# Patient Record
Sex: FEMALE | Race: WHITE | Employment: FULL TIME | URBAN - NONMETROPOLITAN AREA
[De-identification: names, ages, dates, MRNs, and addresses within clinical notes are randomized per-mention and may not be internally consistent; named-entity substitution may affect disease eponyms.]

---

## 2018-05-07 ENCOUNTER — HOSPITAL ENCOUNTER (OUTPATIENT)
Dept: WOMENS IMAGING | Age: 53
Discharge: HOME | End: 2018-05-07
Payer: COMMERCIAL

## 2018-05-07 DIAGNOSIS — Z12.31 VISIT FOR SCREENING MAMMOGRAM: ICD-10-CM

## 2018-05-07 PROCEDURE — 77063 BREAST TOMOSYNTHESIS BI: CPT

## 2019-05-10 ENCOUNTER — HOSPITAL ENCOUNTER (OUTPATIENT)
Dept: WOMENS IMAGING | Age: 54
Discharge: HOME | End: 2019-05-10
Payer: COMMERCIAL

## 2019-05-10 DIAGNOSIS — Z12.31 VISIT FOR SCREENING MAMMOGRAM: ICD-10-CM

## 2019-05-10 PROCEDURE — 77063 BREAST TOMOSYNTHESIS BI: CPT

## 2019-05-15 ENCOUNTER — HOSPITAL ENCOUNTER (OUTPATIENT)
Dept: WOMENS IMAGING | Age: 54
Discharge: HOME | End: 2019-05-15
Payer: COMMERCIAL

## 2019-05-15 DIAGNOSIS — R92.8 ABNORMAL MAMMOGRAM: ICD-10-CM

## 2019-05-15 DIAGNOSIS — R92.2 BREAST DENSITY: ICD-10-CM

## 2019-05-15 PROCEDURE — G0279 TOMOSYNTHESIS, MAMMO: HCPCS

## 2019-05-15 PROCEDURE — 76642 ULTRASOUND BREAST LIMITED: CPT

## 2019-12-13 ENCOUNTER — HOSPITAL ENCOUNTER (OUTPATIENT)
Dept: WOMENS IMAGING | Age: 54
Discharge: HOME | End: 2019-12-13
Payer: COMMERCIAL

## 2019-12-13 DIAGNOSIS — Z09 FOLLOW-UP EXAM: ICD-10-CM

## 2019-12-13 DIAGNOSIS — R92.2 BREAST DENSITY: ICD-10-CM

## 2019-12-13 PROCEDURE — G0279 TOMOSYNTHESIS, MAMMO: HCPCS

## 2020-06-26 ENCOUNTER — HOSPITAL ENCOUNTER (OUTPATIENT)
Dept: WOMENS IMAGING | Age: 55
Discharge: HOME | End: 2020-06-26
Payer: COMMERCIAL

## 2020-06-26 PROCEDURE — 77063 BREAST TOMOSYNTHESIS BI: CPT

## 2021-02-10 ENCOUNTER — HOSPITAL ENCOUNTER (OUTPATIENT)
Age: 56
Discharge: HOME | End: 2021-02-10
Payer: COMMERCIAL

## 2021-02-10 LAB
ALBUMIN SERPL-MCNC: 4.5 G/DL (ref 3.5–5.1)
ALP BLD-CCNC: 49 U/L (ref 38–126)
ALT SERPL-CCNC: 14 U/L (ref 11–66)
ANION GAP SERPL CALCULATED.3IONS-SCNC: 9 MEQ/L (ref 8–16)
AST SERPL-CCNC: 18 U/L (ref 5–40)
BASOPHILS # BLD: 0.7 %
BASOPHILS ABSOLUTE: 0 THOU/MM3 (ref 0–0.1)
BILIRUB SERPL-MCNC: 1.3 MG/DL (ref 0.3–1.2)
BUN BLDV-MCNC: 16 MG/DL (ref 7–22)
CALCIUM SERPL-MCNC: 9.8 MG/DL (ref 8.5–10.5)
CHLORIDE BLD-SCNC: 104 MEQ/L (ref 98–111)
CHOLESTEROL, TOTAL: 162 MG/DL (ref 100–199)
CO2: 27 MEQ/L (ref 23–33)
CREAT SERPL-MCNC: 0.7 MG/DL (ref 0.4–1.2)
EOSINOPHIL # BLD: 1.4 %
EOSINOPHILS ABSOLUTE: 0.1 THOU/MM3 (ref 0–0.4)
ERYTHROCYTE [DISTWIDTH] IN BLOOD BY AUTOMATED COUNT: 13.2 % (ref 11.5–14.5)
ERYTHROCYTE [DISTWIDTH] IN BLOOD BY AUTOMATED COUNT: 48.3 FL (ref 35–45)
GFR SERPL CREATININE-BSD FRML MDRD: 87 ML/MIN/1.73M2
GLUCOSE BLD-MCNC: 87 MG/DL (ref 70–108)
HCT VFR BLD CALC: 39.1 % (ref 37–47)
HDLC SERPL-MCNC: 94 MG/DL
HEMOGLOBIN: 13.1 GM/DL (ref 12–16)
IMMATURE GRANS (ABS): 0.01 THOU/MM3 (ref 0–0.07)
IMMATURE GRANULOCYTES: 0.2 %
LDL CHOLESTEROL CALCULATED: 59 MG/DL
LYMPHOCYTES # BLD: 35.7 %
LYMPHOCYTES ABSOLUTE: 1.6 THOU/MM3 (ref 1–4.8)
MCH RBC QN AUTO: 33.4 PG (ref 26–33)
MCHC RBC AUTO-ENTMCNC: 33.5 GM/DL (ref 32.2–35.5)
MCV RBC AUTO: 99.7 FL (ref 81–99)
MONOCYTES # BLD: 9.2 %
MONOCYTES ABSOLUTE: 0.4 THOU/MM3 (ref 0.4–1.3)
NUCLEATED RED BLOOD CELLS: 0 /100 WBC
PLATELET # BLD: 261 THOU/MM3 (ref 130–400)
PMV BLD AUTO: 9.7 FL (ref 9.4–12.4)
POTASSIUM SERPL-SCNC: 3.9 MEQ/L (ref 3.5–5.2)
RBC # BLD: 3.92 MILL/MM3 (ref 4.2–5.4)
SEG NEUTROPHILS: 52.8 %
SEGMENTED NEUTROPHILS ABSOLUTE COUNT: 2.3 THOU/MM3 (ref 1.8–7.7)
SODIUM BLD-SCNC: 140 MEQ/L (ref 135–145)
TOTAL PROTEIN: 7 G/DL (ref 6.1–8)
TRIGL SERPL-MCNC: 47 MG/DL (ref 0–199)
TSH SERPL DL<=0.05 MIU/L-ACNC: 2.49 UIU/ML (ref 0.4–4.2)
WBC # BLD: 4.4 THOU/MM3 (ref 4.8–10.8)

## 2021-02-10 PROCEDURE — 36415 COLL VENOUS BLD VENIPUNCTURE: CPT

## 2021-02-10 PROCEDURE — 80053 COMPREHEN METABOLIC PANEL: CPT

## 2021-02-10 PROCEDURE — 85025 COMPLETE CBC W/AUTO DIFF WBC: CPT

## 2021-02-10 PROCEDURE — 80061 LIPID PANEL: CPT

## 2021-02-10 PROCEDURE — 84443 ASSAY THYROID STIM HORMONE: CPT

## 2021-06-30 ENCOUNTER — HOSPITAL ENCOUNTER (OUTPATIENT)
Dept: WOMENS IMAGING | Age: 56
Discharge: HOME | End: 2021-06-30
Payer: COMMERCIAL

## 2021-06-30 DIAGNOSIS — Z12.31 VISIT FOR SCREENING MAMMOGRAM: ICD-10-CM

## 2021-06-30 PROCEDURE — 77063 BREAST TOMOSYNTHESIS BI: CPT

## 2021-07-02 ENCOUNTER — HOSPITAL ENCOUNTER (OUTPATIENT)
Dept: WOMENS IMAGING | Age: 56
Discharge: HOME | End: 2021-07-02
Payer: COMMERCIAL

## 2021-07-02 DIAGNOSIS — R92.0 MICROCALCIFICATION OF RIGHT BREAST ON MAMMOGRAM: ICD-10-CM

## 2021-07-02 PROCEDURE — G0279 TOMOSYNTHESIS, MAMMO: HCPCS

## 2021-07-12 ENCOUNTER — HOSPITAL ENCOUNTER (OUTPATIENT)
Dept: WOMENS IMAGING | Age: 56
Discharge: HOME | End: 2021-07-12
Payer: COMMERCIAL

## 2021-07-12 DIAGNOSIS — R92.0 MICROCALCIFICATION OF RIGHT BREAST ON MAMMOGRAM: ICD-10-CM

## 2021-07-12 DIAGNOSIS — R92.1 BREAST CALCIFICATION, RIGHT: ICD-10-CM

## 2021-07-12 PROCEDURE — 88342 IMHCHEM/IMCYTCHM 1ST ANTB: CPT

## 2021-07-12 PROCEDURE — 77065 DX MAMMO INCL CAD UNI: CPT

## 2021-07-12 PROCEDURE — 88360 TUMOR IMMUNOHISTOCHEM/MANUAL: CPT

## 2021-07-12 PROCEDURE — 2709999900 MAM STEREO BREAST BX W LOC DEVICE 1ST LESION RIGHT

## 2021-07-12 PROCEDURE — 88305 TISSUE EXAM BY PATHOLOGIST: CPT

## 2021-07-12 PROCEDURE — 88341 IMHCHEM/IMCYTCHM EA ADD ANTB: CPT

## 2021-07-12 NOTE — PROGRESS NOTES
Formulation and discussion of sedation / procedure plans, risks, benefits, side effects and alternatives with patient and/or responsible adult completed.     Electronically signed by Shani Garcia MD on 7/12/2021 at 8:14 AM

## 2021-07-12 NOTE — PROGRESS NOTES
Breast Biopsy Flowsheet/Post-Operative Care    Date of Procedure: 7/12/2021  Physician: Dr. Genia Gomez  Technologist: Netta Llamas RT(R)(M)    Biopsy:stereotactic breast biopsy  Lesion type: Non-palpable  Breast: left    Clock face position: Site #1: UOQ        Primary Method of Detection: Mammogram      Microcalcification's: no   Distribution: Grouped    Asymmetry: asymmetric    Biopsy Method:   Mammotome:    Site # 1    Gauge: 14    # of Passes: 6     Clip: Coil        Pre-Op Assessment: (BI-RADS)   4. Suspicious Abnormality    Patient Tolerated Procedure: good  Complications: none  Comments:  Dr. Genia Gomez cancelled the biopsy for the second set of calcs.     Post Operative Care  Steri strips: Yes  Dressing: Gauze, Tape   Ice Applied to Site:  Yes  Evidence of Bleeding:  No    Pain Verbalized: No      Written Discharge Instructions: Yes  Condition at Discharge: good  [facility]    Electronically signed by Bharath Patel on 7/12/2021 at 9:22 AM

## 2021-07-12 NOTE — PROGRESS NOTES
[facility] MEDICAL [facility]  Pre-Biopsy Assessment      Patient Education    Written information about procedure Yes  right   Procedural steps explained Yes Stereotactic Biopsy   Post-op potential: bruising, hematoma, pain Yes    Self-care: activity, care of dressing Yes    Patient verbalized understanding Yes    Consent signed and witnessed Yes      Hormone Therapy Status: none    Recent Medication:  Fish oil  Last Dose: last week                                     Hormone Replacement Therapy: no    Previous Breast Biopsy: no    Previous Diagnosis Cancer: no    Hysterectomy:no    Emotional Status: Calm    Language or Physical Barriers: none    Comments: none      Electronically signed by Bladimir Celis on 7/12/2021 at 8:19 AM

## 2021-07-13 ENCOUNTER — CLINICAL DOCUMENTATION (OUTPATIENT)
Dept: WOMENS IMAGING | Age: 56
End: 2021-07-13

## 2021-07-13 NOTE — PROGRESS NOTES
[facility]    Diagnosis:  DCIS    Home Disposition: Lives with     Contact Information: Arrived alone for biopsy results. Dr. Es Lin discussed pathology and recommended integrated breast clinic. Encouraged Breast Clinic attendance. All cards given for surgeons and oncologists. Wants to see Dr(s): undecided. Patient Expresses Need(s) For: Wanting to have the MRI as soon as possible. Requests Referral to Doctor(s) with appointment(s): n/a    Additional Referral(s): Reynaldo Watson/Monika Mccall: Breast Navigators     Integrated breast cancer clinic appointment: 7-16-21 @ 7:30    Biopsy site status: good    Teaching Sheets provided: Cancer Type: DCIS, What is Breast Cancer, Tumor Markers, Needle Localization, Lumpectomy/Mastectomy Comparison, Radiation Therapy, MRI, Genetic testing,  Breast Cancer Navigation Packet, Nurse Navigation contact information, Breast Clinic appointment and map. Currently on HRT: no    If yes, was patient instructed to stop immediately: N/A     Notes: Explained terms navigators will discuss at next appointments. Questions addressed and encouraged patient to ask Breast Navigators. Will receive call within 24 hrs. Referral emailed to Navigation. Answered yes on Genetic Risk Assessment: yes     Additional information: Sister diagnosed fall 2020. Choose bilateral mastectomy - one being prophylactic- Final pathology- bilateral breast cancer. Sister also had genetic testing which was negative. Testing and treatment done at the [facility]. Guillaume Hollingsworth has not chosen a surgeon, but is anxious to get the breast MRI. I contacted Justine at [address] [place] [address] office to an order the MRI. Radha's results and Dr. Franki Hodgkin recommendation for the MRI [facility]       Results Faxed to:  Dr. Marcy Almanza and [facility]

## 2021-07-15 ENCOUNTER — CLINICAL DOCUMENTATION (OUTPATIENT)
Dept: CASE MANAGEMENT | Age: 56
End: 2021-07-15

## 2021-07-15 NOTE — PROGRESS NOTES
Name: Florina Valles  : 1965  MRN: [id number]     Oncology Navigation- Initial Note: Teaching    Intake-  Contact Type: Cancer Center-teaching with navigator    Diagnosis: Breast-malignant    Home Disposition: Lives with other who is able to assist,  Rody Laureano    Patient needs and barriers to care: Coordination of Care, Knowledge deficit, Emotional Issues/ Fear/ Anxiety and Symptom Management     Referral Source: Outpatient    Receptive to Advanced Care Planning/ Palliative Care:  NA stage 0    Interventions-   General Interventions: Arrived with daughter Ryan RN for teaching on newly diagnosed right breast DCIS ER- [place]-. Genetics/Family Hx: Sister with breast cancer age 40, Father prostate cancer 63's, PGM uterine cancer \"elderly\", pat. Cousin ovarian cancer age 25. Patient requesting genetic testing. Will obtain a referral to [facility]. Education/Screenings:  yes -  Breast cancer information packet and navigation welcome packet reviewed. Printed material provided and reviewed on DCIS, lumpectomy vs mastectomy, tumor markers, lymphedema risks and prevention, post-op exercises to begin when approved by surgeon . Post- op pillow provided. Will need instructed for localization at the time of the procedure. Currently on HRT: no     Referrals: Spiritual Care, financial navigator     Continuum of Care: Diagnosis/Active Treatment    Notes: Teaching completed, questions addressed, emotional support provided. Contact information provided and patient encouraged to call with any questions or concerns. Will follow through continuum of care.     Electronically signed by Ilda Perez RN on 7/15/2021 at 4:26 PM

## 2021-07-16 ENCOUNTER — TELEPHONE (OUTPATIENT)
Dept: SURGERY | Age: 56
End: 2021-07-16

## 2021-07-16 ENCOUNTER — VIRTUAL VISIT (OUTPATIENT)
Dept: ONCOLOGY | Age: 56
End: 2021-07-16
Payer: COMMERCIAL

## 2021-07-16 ENCOUNTER — CLINICAL DOCUMENTATION (OUTPATIENT)
Dept: CASE MANAGEMENT | Age: 56
End: 2021-07-16

## 2021-07-16 ENCOUNTER — HOSPITAL ENCOUNTER (OUTPATIENT)
Dept: MRI IMAGING | Age: 56
Discharge: HOME | End: 2021-07-16
Payer: COMMERCIAL

## 2021-07-16 DIAGNOSIS — C50.411 CARCINOMA OF BREAST UPPER OUTER QUADRANT, RIGHT (HCC): Primary | ICD-10-CM

## 2021-07-16 DIAGNOSIS — Z85.3 PERSONAL HISTORY OF MALIGNANT NEOPLASM OF BREAST: ICD-10-CM

## 2021-07-16 DIAGNOSIS — D05.11 DUCTAL CARCINOMA IN SITU (DCIS) OF RIGHT BREAST: Primary | ICD-10-CM

## 2021-07-16 DIAGNOSIS — Z80.42 FAMILY HISTORY OF PROSTATE CANCER: ICD-10-CM

## 2021-07-16 DIAGNOSIS — Z80.3 FAMILY HISTORY OF BREAST CANCER: ICD-10-CM

## 2021-07-16 DIAGNOSIS — Z80.41 FAMILY HISTORY OF OVARIAN CANCER: ICD-10-CM

## 2021-07-16 PROCEDURE — 6360000004 HC RX CONTRAST MEDICATION: Performed by: OBSTETRICS & GYNECOLOGY

## 2021-07-16 PROCEDURE — 99999 PR OFFICE/OUTPT VISIT,PROCEDURE ONLY: CPT | Performed by: GENETIC COUNSELOR, MS

## 2021-07-16 PROCEDURE — A9579 GAD-BASE MR CONTRAST NOS,1ML: HCPCS | Performed by: OBSTETRICS & GYNECOLOGY

## 2021-07-16 PROCEDURE — 77049 MRI BREAST C-+ W/CAD BI: CPT

## 2021-07-16 RX ADMIN — GADOTERIDOL 10 ML: 279.3 INJECTION, SOLUTION INTRAVENOUS at 13:56

## 2021-07-16 NOTE — PROGRESS NOTES
Name: Olga Post  : 1965  MRN: [id number]    Oncology Navigation Follow-Up Note      Contact Type: Integrated Breast Cancer Clinic    Interventions-   General Interventions: Patient and  arrived to breast clinic. Treatment plan discussed with breast team, Salvatore Sotelo, Lou. Questions addressed. Emotional support given. Education/Screenings: Breast Clinic Recommendation form completed, discussed, and copy given to patient. Refer to Recommendation form under media tab for further information. Referrals: Oncology Rehab for baseline assessment,  consult with Dr. Emily Hunt. Referral made to Dr. Hailey Torres for 2 weeks post op, information given to [facility] and Jcarlos Rubio. Continuum of Care: Diagnosis/Active Treatment     Notes: Blood draw for genetic testing by [facility] kit used. Packaged per protocol and notified [employer] for pick-up today. Shipping label scanned under Media. [address] notified.     Electronically signed by Saintclair Chase, RN on 2021 at 8:19 AM

## 2021-07-16 NOTE — PROGRESS NOTES
[facility] Counseling Program   Hereditary Cancer Risk Assessment     Name: Alison Da Silva   YOB: 1965   Date of Consultation: 21     Ms. Leatha Olsen completed a genetic counseling consultation via telephone on 21. Ms. Leatha Olsen was referred by Dr. Patricia Chinchilla due to her personal and family history of cancer. PERSONAL HISTORY   Ms. Leatha Olsen is a 55 y.o.  female who was recently diagnosed with breast cancer at age 55. 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 She is awaiting the results of her genetic testing before making a final decision regarding her surgical treatment. She is interested in risk reducing mastectomy if results suggest an increased risk for a second primary breast cancer. FAMILY HISTORY  Ms. Leatha Olsen has 1 son (25y) and 1 daughter (28y). She has 5 full sisters. One sister was diagnosed with breast cancer at age 40. She will be checking on the specifics of her sister's genetic test results. Ms. Radu Eden father passed away in his 62s from lung cancer and had a prior history of prostate cancer diagnosed in his 62s. His mother (Ms. Lemon's paternal grandmother) was diagnosed with uterine cancer likely over age 48. Her father had 2 sisters and 1 brother, none with cancer. However, one of her aunt's daughters (Ms. Lemon's paternal first cousin)  of an ovarian cancer at age 25. Ms. Leatha Olsen reports [place] ancestry and denies any known Ashkenazi Sikh heritage. RISK ASSESSMENT   We discussed that approximately 5-10% of cancers are due to a hereditary gene mutation which causes an increased risk for certain cancers. Hereditary cancers are typically diagnosed at younger ages (under age 46y) and occur in multiple generations of a family. Multiple individuals with the same type of cancer (example: breast) or uncommon cancers (example: ovarian, pancreatic, male breast cancer) are also features of hereditary cancers. In summary, Ms. [address] (NCCN) guidelines for genetic testing of the BRCA1/2 genes based on her diagnosis of breast cancer and having a first degree relative with breast cancer under age 48. The NCCN guidelines also recognize that an individual's personal and/or family history may be explained by more than one inherited cancer syndrome. Thus, a multi-gene panel may be more efficient, more cost effecting, and increases the yield of detecting a hereditary mutation which would impact medical management. Given her personal and/or family history, we recommend testing for the following genes at minimum: RYANNE, CHEK2, NBN, and PALB2. DISCUSSION  We discussed that the BRCA1/2 genes are the most common genes associated with hereditary breast and ovarian cancer. We also discussed that genetic testing is available for multiple other genes related to hereditary cancer. Some of these genes are known to carry a significant increased risk for several cancers including colon, breast, uterine, ovarian, stomach, and pancreatic cancer, while some of these genes are believed to have a moderate increased risk for breast and other cancers. We discussed the possibility of finding a mutation in genes with limited information to guide medical management, as well we as the possibility of identifying variants of uncertain significance (VUS). We discussed the risks, benefits, and limitations of genetic testing. Possible test results were discussed as well as potential screening and prevention strategies. Specifically, we discussed increased breast cancer surveillance by mammogram and breast MRI as well as the option of prophylactic mastectomy. We discussed the recommendation for prophylactic oophorectomy for results which suggest an increased risk for ovarian cancer. Lastly, we discussed that the results of Ms. Lemon's genetic testing may be beneficial in defining her risk for cancer as well as for her family members. SUMMARY & PLAN  1) Ms. Carmela Fournier meets the NCCN criteria for genetic testing based on her personal and family history of breast cancer. 2) Genetic testing for the BRCA1/2 genes along with other genes associated with hereditary cancer was offered to Ms. Carmela Fournier. She elected to proceed with the CancerNext Expanded + RNA Insight gene panel. 3) Informed consent was obtained. A blood sample was previously collected and sent to [facility]. 4) We will call Ms. Carmela Fournier with results as soon as they are available. A follow up appointment may be recommended. A summary letter with results and final medical management recommendations will be sent once available. A total of 30 minutes were spent with Ms. Carmela Fournier and 50% of the time was spent educating and counseling. The [facility] National Program would be glad to offer our assistance should you have any questions or concerns about this information. Please feel free to contact us at [phone]. Sharon Cary.  Tricia Dear, MS, [facility]   Licensed Genetic Counselor

## 2021-07-18 NOTE — PROGRESS NOTES
Autumn David MD   General Surgery  New Patient Evaluation in Office  Pt Name: Morgan Farley  Date of Birth 1965   Today's Date: 7/19/2021  Medical Record Number: [id number]  Referring Provider: Kayode Miller*  Primary Care Provider: ANNA Glez - NAHOMI  Chief Complaint:  Chief Complaint   Patient presents with    Surgical Consult     new patient--ref by [facility] for right breast DCIS-MRI breast 7/16     Stage 0 high-grade ER negative, DC negative  ASSESSMENT      1. Ductal carcinoma of right breast ([address]    2. Pre-op testing    3. Dense breast tissue on mammogram         PLANS     1. Bilateral breast MRI prior to any surgical intervention due to very dense breasts on mammography and clinical examination. MRI performed and no suspicious findings. Discussed with patient DCIS is not well seen on MRI. 2.  Genetic testing and counseling. Patient meets criteria. She wishes to proceed. 3.  Treatment of DCIS discussed with patient and her . Discussed reasoning for MRI as with her dense breast tissue could hide an additional lesion. Breast conservation with lumpectomy and postoperative radiation versus mastectomy with and without reconstruction were discussed. Results of genetic testing would likely alter her surgical decision making process. Sister with a history of breast cancer at age 40.  3.  Preoperative routine testing. 5.  Schedule patient for left partial mastectomy with magnetic seed localization pending results of additional testing. Janell Kruse is a 55y.o. 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 Trae Zelaya denied any breast symptoms and presented for screening mammography. She has very, extremely dense breast which lowers the sensitivity of her mammography.   There was a possible cluster of calcifications in the upper outer breast posterior depth. On diagnostic imaging there is a 1 cm cluster of pleomorphic calcifications in the posterior depth. There is a second cluster just inferior and posterior to the first cluster. She underwent stereotactic core biopsy which revealed a high-grade ductal carcinoma in situ which was ER and SD negative. Patient had not had previous breast biopsies. Her sister was diagnosed with triple negative breast cancer at age 40. Her sister underwent genetic testing and no deleterious mutation was identified. She was treated at [address] [place] [address]. Rachel De La Paz has no significant medical comorbidities. Menarche age 16. . She had her children at age 33 and 28. She breast-fed both children. She took oral contraceptive medications for 15 years. Menopause age 47 no history of estrogen replacement therapy. Sister 40 history of breast cancer. Father history of prostate and lung cancer. Paternal grandmother history of uterine cancer paternal cousin history of ovarian cancer at a very young age. Past Medical History  Past Medical History:   Diagnosis Date    Breast cancer ([address] 2021    right    COVID-19 2020       Past Surgical History  Past Surgical History:   Procedure Laterality Date    CHOLECYSTECTOMY      Dr Sayda Zabala  2017    Dr. Zuri Kearns MALIHA STEROTACTIC LOC BREAST BIOPSY RIGHT Right 2021    MALIHA STEROTACTIC LOC BREAST BIOPSY RIGHT 2021 Rayne Lundborg, MD [facility]       Medications  Current Outpatient Medications   Medication Sig Dispense Refill    Omega-3 Fatty Acids (SM FISH OIL PO) Take by mouth daily      MAGNESIUM PO Take by mouth daily       No current facility-administered medications for this visit.      Allergies   No Known Allergies    Family History  Family History   Problem Relation Age of Onset    Heart Disease Mother         with stents    Prostate Cancer Father         in his 63's    Lung Cancer Father     Breast Cancer Sister 40        bilat    No Known Problems Sister     No Known Problems Sister     No Known Problems Sister     No Known Problems Sister     Heart Disease Maternal Grandmother     Alzheimer's Disease Maternal Grandfather     Uterine Cancer Paternal Grandmother         >50    No Known Problems Paternal Grandfather     Ovarian Cancer Paternal Cousin 25       SocialHistory  Social History     Socioeconomic History    Marital status:      Spouse name: Not on file    Number of children: 2    Years of education: Not on file    Highest education level: Not on file   Occupational History    Not on file   Tobacco Use    Smoking status: Never Smoker    Smokeless tobacco: Never Used   Vaping Use    Vaping Use: Never used   Substance and Sexual Activity    Alcohol use: Yes     Comment: occasionally    Drug use: Not on file    Sexual activity: Not on file   Other Topics Concern    Not on file   Social History Narrative    Not on file     Social Determinants of Health     Financial Resource Strain:     Difficulty of Paying Living Expenses:    Food Insecurity:     Worried About Running Out of Food in the Last Year:     Ran Out of Food in the Last Year:    Transportation Needs:     Lack of Transportation (Medical):      Lack of Transportation (Non-Medical):    Physical Activity:     Days of Exercise per Week:     Minutes of Exercise per Session:    Stress:     Feeling of Stress :    Social Connections:     Frequency of Communication with Friends and Family:     Frequency of Social Gatherings with Friends and Family:     Attends Holiness Services:     Active Member of Clubs or Organizations:     Attends Club or Organization Meetings:     Marital Status:    Intimate Partner Violence:     Fear of Current or Ex-Partner:     Emotionally Abused:     Physically Abused:     Sexually Abused:            Review of Systems  Review of Systems   Constitutional: Negative Breasts:         Right: No swelling, bleeding, inverted nipple, mass, nipple discharge, skin change or tenderness. Left: No swelling, bleeding, inverted nipple, mass, nipple discharge, skin change or tenderness. Abdominal:      General: There is no distension. Palpations: There is no mass. Tenderness: There is no abdominal tenderness. Musculoskeletal:         General: No deformity. Cervical back: Neck supple. No rigidity or tenderness. Right lower leg: No edema. Left lower leg: No edema. Lymphadenopathy:      Cervical: No cervical adenopathy. Right cervical: No superficial, deep or posterior cervical adenopathy. Left cervical: No superficial, deep or posterior cervical adenopathy. Upper Body:      Right upper body: No supraclavicular, axillary or pectoral adenopathy. Left upper body: No supraclavicular, axillary or pectoral adenopathy. Skin:     General: Skin is warm and dry. Coloration: Skin is not jaundiced or pale. Findings: No rash. Neurological:      General: No focal deficit present. Mental Status: She is alert and oriented to person, place, and time. Cranial Nerves: No cranial nerve deficit. Psychiatric:         Behavior: Behavior normal.         Thought Content:  Thought content normal.         Lab Results   Component Value Date    WBC 5.1 07/22/2021    HGB 14.8 07/22/2021    HCT 46.2 07/22/2021     07/22/2021    CHOL 162 02/10/2021    TRIG 47 02/10/2021    HDL 94 02/10/2021    ALT 14 02/10/2021    AST 18 02/10/2021     02/10/2021    K 3.9 02/10/2021     02/10/2021    CREATININE 0.7 02/10/2021    BUN 16 02/10/2021    CO2 27 02/10/2021    TSH 2.490 02/10/2021       Performed by: Tarsha Wiseman. Pathology     Atlee Files, [facility]                   [id number]   Assoc.                                              Page 1 of 0 [address], [place] [address]                                                       PROC: 2021   [place]                                    RECV: 2021   [address]                                    RPTD: 2021   [facility], [address]                       MRN:  [id number]    LOC: [facility]                       ACCT: [de-identified]  SEX: F                       : 1965  AGE: 55 Y                          PATHOLOGY REPORT                       ATTN: NNEKA QUIROZ                       REQ: Miyarolo Radford       Copies To:   [place] [facility]; Scar Le       Clinical Information: CALCIFICATIONS     FINAL DIAGNOSIS:   A.  Right breast, upper outer quadrant calcifications \"A\", coil clip,   biopsy:       High-grade ductal carcinoma in situ, solid type with focal necrosis       and associated microcalcifications.    Microcalcifications present in nonneoplastic tissue. BREAST BIOMARKERS*   Estrogen Receptor: (Clone SP1), [employer]       Negative (<1% of cells staining)     Progesterone Receptor: (Clone 1E2), Elementum       Negative (<1% of cells staining)     External Controls:  Adequate   Internal Controls:  Adequate   Standard Assay Conditions:  Met     Staining Method Used:    Formalin fixation    Antigen retrieval type:  Cell Conditioning 1, mild mian    Time in antigen retrieval:  30 minutes    Detection system type:   DAB Ultraview kit     B.  Right breast, upper outer quadrant calcifications \"B\", coil clip,   biopsy:    Benign breast tissue with glandular microcalcifications. Specimen:   A) CORE NEEDLE BREAST BIOPSY, RIGHT CALC, UOQ, COIL CLIP   B) CORE NEEDLE BREAST BIOPSY, RIGHT CALC, UOQ, COIL CLIP       Gross Examination:   A - The [facility],   calcifications, upper outer quadrant, coil clip, A.  Received in   formalin are multiple bits of yellow-white fibrofatty tissue   aggregating to about 2 x 1.8 x 0.2 cm.  1 ns.      Fixative:  10% neutral buffered formalin   Tissue removal time:  08:46   Radiology time:  08:48   Tissue fixation time:  08:50   Total fixation time: 11 hours 10 minutes     B - The container is labeled Justice Mccall, right breast,   calcifications, coil clip, upper outer quadrant, B.  Received in   formalin are two fragments of yellow-white fibrofatty tissue, each   about 1 cm in greatest dimension.  1 ns.  PCF/DKR:[id number]     Fixative:  10% neutral buffered formalin   Tissue removal time:  08:46   Radiology time:  08:48   Tissue fixation time:  08:50   Total fixation time: 11 hours 10 minutes     Microscopic Examination:   A.  Sections show few ducts expanded by tumor cells containing enlarged   vesicular nuclei with eosinophilic nucleoli.  Occasional ducts are   distorted and irregular with periductal stromal fibrosis and a   lymphocytic infiltrate.  That said, histologic evidence of   microinvasive carcinoma is absent.  Immunohistochemistry for p63 and   CK5/6 highlight retained myoepithelial cells.  Controls are adequate. Procedure: Needle biopsy   Specimen laterality: Right   Tumor site: Upper outer quadrant   Histologic type: Ductal carcinoma in situ   Architectural patterns: Solid   Nuclear grade: Grade III (high)   Necrosis: Present, focal   Microcalcifications: Present in DCIS and in nonneoplastic tissue     B.  Sections show dense fibroglandular breast tissue with stromal   fibrosis and occasional glandular microcalcifications. Suann Pillar is no   evidence of atypia or malignancy. *This test was developed and its performance characteristics determined   by [address] [place] [address] has not been cleared or   approved by the U.S. Food and Drug Administration.  Pursuant to the   requirements of CLIA, this laboratory has established and verified the   test's accuracy and precision.  Additional information about this type   of test is available upon request.     [id number]   [id number]   [id number]   [id number]                                                       <Sign Toñito Patel M.D., F.C. A. P       NV[facility]/ [address] [facility]  Printed on:  7/13/2021   Isaiah Gao, [address]           Narrative   EXAM: MRI BREAST BILATERAL W WO CONTRAST        INDICATION: Newly diagnosed right breast ductal carcinoma in situ upper outer quadrant, posterior depth.       HISTORY: 56 years, Female, Personal history of malignant neoplasm of breast       COMPARISON: 7/12/2020, 7/2/2021, 6/30/2021, 6/26/2020, 5/10/2019.       TECHNIQUE: Axial and coronal T2 STIR, sagittal T1, axial T1 fat saturated pre and postcontrast dynamic imaging was performed of both breasts. Intravenous ProHance gadolinium was given. Images were reviewed on a separate dedicated 3-D workstation and time    intensity curves were analyzed.        FINDINGS:       Amount of Fibroglandular Tissue: The breast parenchyma is extremely dense. Background Parenchymal Enhancement:  Minimal.       Right breast: The biopsy clip is noted in the upper outer right breast, posterior depth. On either side of this, there is minimal enhancement. There is no suspicious morphology. There is no suspicious enhancement. There is no masslike area. There are no    areas of suspicious enhancement anywhere in the breast. There is no suspicious morphology. No lesions are present.       Left breast: There are no areas of suspicious enhancement. There is no suspicious morphology. No lesions are present.       There are no abnormalities in the axilla, mediastinum or visualized bones. There do appear to be a few small simple cysts in the liver.                   Impression   No MR evidence of malignancy on either side.  The biopsy-proven site of DCIS in the right upper outer quadrant does not have an MR correlate.           Surgical management is recommended.               BI-RADS CATEGORY 6: Known biopsy proven malignancy.       Management: Surgical excision when clinically errors which are inherent in voice recognition technology. **       Final report electronically signed by Dr. Blank June on 6/30/2021 4:53 PM            LOCATION: [facility]       PROCEDURE: MALIHA HAI DIGITAL DIAGNOSTIC UNILATERAL RIGHT       CLINICAL INFORMATION: Microcalcification of right breast on mammogram . Tomosynthesis. Right breast calcifications seen on screening mammography. .       Tyrer-Cuzick calculation reports this patient's 10 year risk for developing breast cancer is 6.3% and lifetime risk is 19.7%.        COMPARISON: 6/30/2021, 6/26/2020, 12/13/2009, 5/10/2019 and 5/7/2018.       TECHNIQUE: Images of the right breast include a full field LM view and magnification CC, MLO and LM views. A magnification exaggerated cc was also obtained. . Tomosynthesis and CAD were utilized.        BREAST COMPOSITION: The tissue of the breast(s) is extremely dense, which lowers the sensitivity of mammography.       FINDINGS:        In the upper outer right breast there is a 1 cm cluster of pleomorphic calcifications. This is posterior depth. These are indeterminate.       There is a possible 2nd cluster of calcifications seen on the MLO and LM magnification view just inferior and posterior to this 1st cluster. These are indeterminate. These are not readily seen on the cc view.       No other significant masses, calcifications, or other findings are seen in the breast(s).             Impression       1. Cluster of pleomorphic calcifications in the upper outer right breast posterior depth. A stereotactic guided biopsy is recommended.       2. 2nd cluster of possible calcifications are seen on the LM and MLO views only just inferior to the 1st cluster. Stereotactic guided biopsy of these calcifications is also recommended.       These results were discussed with the patient. The tech navigator was notified.  We will arrange scheduling the patient for her procedure per department protocol.                   BI-RADS CATEGORY 4B - Intermediate suspicion for malignancy.       Management: Tissue diagnosis.  Biopsy should be performed in the absence of clinical contraindication.               **This report has been created using voice recognition software. It may contain minor errors which are inherent in voice recognition technology. **       Final report electronically signed by Dr. Van Kee on 2021 11:52 AM          [facility] HAI DIGITAL SCREEN SELF REFERRAL W OR WO CAD BILATERAL (Order [id number])  Narrative   LOCATION: [facility]       PROCEDURE: MALIHA HAI DIGITAL SCREEN SELF REFERRAL W OR WO CAD BILATERAL       CLINICAL INFORMATION: Visit for screening mammogram. Tomosynthesis.       CLINICAL:     Self-referred screening mammogram   PATIENT MEDICAL HISTORY:  No relevant medical history has been documented for this patient. FAMILY HISTORY:   Family medical history includes breast cancer in sister. RISK VALUES: Tyrer-Cuzick 10yr.: 6.3%, Tyrer-Cuzick life:   19.7%       COMPARISON: 2020, 2019, 5/10/2019 and 2018.       TECHNIQUE: Bilateral CC and MLO views of the breasts were obtained.  3D tomosynthesis was utilized.  CAD was utilized.       BREAST COMPOSITION: The tissue of the breast(s) is extremely dense, which lowers the sensitivity of mammography.       FINDINGS: Gladis Collar is a possible cluster of calcifications in the upper outer right breast, posterior depth.        No other significant masses, calcifications, or other findings are seen in the breast(s).                Impression   Possible cluster of calcifications in the upper outer right breast. Magnification views and a right LM view are recommended.       The patient will be contacted by our department per protocol regarding additional imaging and scheduling.       .           BI-RADS CATEGORY 0: NEED ADDITIONAL EVALUATION AND/OR PRIOR MAMMOGRAMS FOR COMPARISON.       Management Recommendation: Recall for additional imaging.       The patient will be contacted by our department for additional imaging/scheduling.               **This report has been created using voice recognition software. It may contain minor errors which are inherent in voice recognition technology. **       Final report electronically signed by Dr. Gayle Del Valle on 6/30/2021 4:53 PM             Imaging reviewed.

## 2021-07-19 ENCOUNTER — TELEPHONE (OUTPATIENT)
Dept: SPIRITUAL SERVICES | Facility: CLINIC | Age: 56
End: 2021-07-19

## 2021-07-19 ENCOUNTER — OFFICE VISIT (OUTPATIENT)
Dept: SURGERY | Age: 56
End: 2021-07-19
Payer: COMMERCIAL

## 2021-07-19 VITALS
HEART RATE: 74 BPM | RESPIRATION RATE: 18 BRPM | SYSTOLIC BLOOD PRESSURE: 118 MMHG | HEIGHT: 61 IN | BODY MASS INDEX: 22.41 KG/M2 | DIASTOLIC BLOOD PRESSURE: 58 MMHG | OXYGEN SATURATION: 93 % | TEMPERATURE: 97.3 F | WEIGHT: 118.7 LBS

## 2021-07-19 DIAGNOSIS — R92.2 DENSE BREAST TISSUE ON MAMMOGRAM: ICD-10-CM

## 2021-07-19 DIAGNOSIS — Z01.818 PRE-OP TESTING: ICD-10-CM

## 2021-07-19 DIAGNOSIS — C50.911 DUCTAL CARCINOMA OF RIGHT BREAST (HCC): Primary | ICD-10-CM

## 2021-07-19 PROCEDURE — 99244 OFF/OP CNSLTJ NEW/EST MOD 40: CPT | Performed by: SURGERY

## 2021-07-19 NOTE — LETTER
[address] [facility]  [place] [id number] [facility] [address]  Phone: [phone]  Fax: [phone]    Emma Vargas MD        July 25, 2021    Patient: Efrain Hopkins   MR Number: [id number]   YOB: 1965   Date of Visit: 7/19/2021     Dear Dr. Levar Villareal,    Thank you for the request for consultation for Efrain Hopkins to me for the evaluation of high-grade ductal carcinoma in situ of the right breast. Below are the relevant portions of my assessment and plan of care. 1. Ductal carcinoma of right breast ([address]    2. Pre-op testing    3. Dense breast tissue on mammogram        1. Bilateral breast MRI prior to any surgical intervention due to very dense breasts on mammography and clinical examination. MRI performed and no suspicious findings. Discussed with patient DCIS is not well seen on MRI. 2.  Genetic testing and counseling. Patient meets criteria. She wishes to proceed. 3.  Treatment of DCIS discussed with patient and her . Discussed reasoning for MRI as with her dense breast tissue could hide an additional lesion. Breast conservation with lumpectomy and postoperative radiation versus mastectomy with and without reconstruction were discussed. Results of genetic testing would likely alter her surgical decision making process. Sister with a history of breast cancer at age 40.  3.  Preoperative routine testing. 5.  Schedule patient for left partial mastectomy with magnetic seed localization pending results of additional testing. If you have questions, please do not hesitate to call me. I look forward to following [employer]  along with you.     Sincerely,          Emma Vargas MD

## 2021-07-19 NOTE — TELEPHONE ENCOUNTER
[facility] PROGRESS NOTE      Patient: Odean Oppenheim            YOB: 1965  Age: 55 y.o. Gender: female            Assessment:  Tara\" as she likes to be called is a 55year old female who is being referred for spiritual care services by the staff at the [facility] wellness center. She was called on her phone by this . she  Answered the phone and stated she is doing good. Interventions:  After our services were explained, words of encouragement given to Morris. She is Yarsanism and belongs to [address] in [facility]. Outcomes:  She is not interested in our services at this time stating,\"I'm well supported by my Hinduism. \"    Plan: 1. Care Plan:  Continue spiritual and emotional care for patient and family. Including prayers. Tara tool down our phone number incase she would need us in the future.      Electronically signed by Mendel Madden, on 7/19/2021 at 2:49 PM.  [address] [facility]  [phone]

## 2021-07-22 ENCOUNTER — HOSPITAL ENCOUNTER (OUTPATIENT)
Age: 56
Discharge: HOME | End: 2021-07-22
Payer: COMMERCIAL

## 2021-07-22 DIAGNOSIS — C50.911 DUCTAL CARCINOMA OF RIGHT BREAST (HCC): ICD-10-CM

## 2021-07-22 DIAGNOSIS — Z01.818 PRE-OP TESTING: ICD-10-CM

## 2021-07-22 LAB
BASOPHILS # BLD: 1 %
BASOPHILS ABSOLUTE: 0.1 THOU/MM3 (ref 0–0.1)
EOSINOPHIL # BLD: 3.5 %
EOSINOPHILS ABSOLUTE: 0.2 THOU/MM3 (ref 0–0.4)
ERYTHROCYTE [DISTWIDTH] IN BLOOD BY AUTOMATED COUNT: 12.4 % (ref 11.5–14.5)
ERYTHROCYTE [DISTWIDTH] IN BLOOD BY AUTOMATED COUNT: 45.7 FL (ref 35–45)
HCT VFR BLD CALC: 46.2 % (ref 37–47)
HEMOGLOBIN: 14.8 GM/DL (ref 12–16)
IMMATURE GRANS (ABS): 0.01 THOU/MM3 (ref 0–0.07)
IMMATURE GRANULOCYTES: 0.2 %
LYMPHOCYTES # BLD: 32 %
LYMPHOCYTES ABSOLUTE: 1.6 THOU/MM3 (ref 1–4.8)
MCH RBC QN AUTO: 32 PG (ref 26–33)
MCHC RBC AUTO-ENTMCNC: 32 GM/DL (ref 32.2–35.5)
MCV RBC AUTO: 100 FL (ref 81–99)
MONOCYTES # BLD: 8.8 %
MONOCYTES ABSOLUTE: 0.4 THOU/MM3 (ref 0.4–1.3)
NUCLEATED RED BLOOD CELLS: 0 /100 WBC
PLATELET # BLD: 287 THOU/MM3 (ref 130–400)
PMV BLD AUTO: 9.3 FL (ref 9.4–12.4)
RBC # BLD: 4.62 MILL/MM3 (ref 4.2–5.4)
SEG NEUTROPHILS: 54.5 %
SEGMENTED NEUTROPHILS ABSOLUTE COUNT: 2.8 THOU/MM3 (ref 1.8–7.7)
WBC # BLD: 5.1 THOU/MM3 (ref 4.8–10.8)

## 2021-07-22 PROCEDURE — 36415 COLL VENOUS BLD VENIPUNCTURE: CPT

## 2021-07-22 PROCEDURE — 85025 COMPLETE CBC W/AUTO DIFF WBC: CPT

## 2021-07-25 ASSESSMENT — ENCOUNTER SYMPTOMS
ABDOMINAL PAIN: 0
NAUSEA: 0
SHORTNESS OF BREATH: 0
BLOOD IN STOOL: 0
COLOR CHANGE: 0
COUGH: 0
VOMITING: 0
SORE THROAT: 0
WHEEZING: 0

## 2021-08-02 ENCOUNTER — HOSPITAL ENCOUNTER (OUTPATIENT)
Dept: PHYSICAL THERAPY | Age: 56
Setting detail: THERAPIES SERIES
Discharge: HOME | End: 2021-08-02
Payer: COMMERCIAL

## 2021-08-02 PROCEDURE — 97161 PT EVAL LOW COMPLEX 20 MIN: CPT

## 2021-08-02 PROCEDURE — 97110 THERAPEUTIC EXERCISES: CPT

## 2021-08-02 NOTE — PROGRESS NOTES
* PLEASE SIGN, DATE AND TIME CERTIFICATION BELOW AND RETURN TO [facility] OUTPATIENT REHABILITATION (FAX #: [phone]). ATTEST/CO-SIGN IF ACCESSING VIA INAFFiRiS. THANK YOU.**    I certify that I have examined the patient below and determined that Physical Medicine and Rehabilitation service is necessary and that I approve the established plan of care for up to 90 days or as specifically noted. Attestation, signature or co-signature of physician indicates approval of certification requirements.    ________________________ ____________ __________  Physician Signature   Date   Time  [facility]  PHYSICAL THERAPY  [x] EVALUATION    [x]  [facility]     Date: 2021  Patient Name:  Matt Kapadia  : 1965  MRN: [id number]      Referring Practitioner Jessica Brown MD with Dr. Ashley Campos to follow   Diagnosis Malignant neoplasm of upper-outer quadrant of right female breast [C50.411]    Treatment Diagnosis Postural Abnormality, Z71.9   Date of Evaluation 21    Additional Pertinent History COVID-19      Functional Outcome Measure Used O: QuickDASH   Functional Outcome Score 0% (21)       Insurance: Primary: Payor: Robinson Goldberg /  /  / ,   Secondary:    Authorization Information: Aquatics and modalities approved, no ionto   Visit # 1, 1/10 for progress note   Visits Allowed: 79/[id number] year   Recertification Date:    Physician Follow-Up: 21 R lumpectomy with MAG seed placement, waiting for genetic testing   Physician Orders: Prehab   History of Present Illness: 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 Social/Functional History and Current Status:  Medications and Allergies have been reviewed and are listed on Medical History Questionnaire. Matt Kapadia lives with spouse in a multiple floor home with stairs and no handrail to enter.     Task Previous Current   ADLs  Independent Independent   IADL's Independent Independent   Ambulation Independent Independent   Transfers Independent Independent   Recreation Independent Enjoys walking, boating on [address]   Driving Active  Active    Work [employer]. 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 OBJECTIVE:   Posture: Poor, Protruded Head  Palpation: no tenderness reported  Observation: No obvious deformity    Range of Motion/Strength (Range of Motion in degrees)    Right Left Comments   Shoulder Flexion PROM 180 180    Shoulder ABDuction PROM 180 180    Shoulder Strength 4+/5 4+/5    Elbow Strength 5/5 5/5      Circumferential Measurements:   Upper Extremity Circumferential Measurements    Right (cm) Left (cm) Comments   Met Heads 17.5 17.5    Ulnar Styloid Process 13.8 13.7    10 cm distal to Lateral Epicondyle 20.6 20.7    Elbow Crease 22.5 22.8    10 cm proximal from Lateral Epicondyle 26.2 26.8    Axilla 28 26.9    Total 128.6 128.4 Axilla - to - Axilla: NT     Brief Fatigue Inventory: 0 (0: none, 1-3: mild, 4-6: moderate, 7-10: severe)  Quick Dash: 0% impaired    Treatment Initiated: Educated to exercises to begin 1 week after surgery pending surgeon's release: scapular retractions, wall walks, and pendulums for left/right, clockwise and counterclockwise. Provided with handout and instructed to focus on 1st 2 exercises if doing well until therapy follow up visit but if having more soreness and/or stiffness, include pendulums to help allow movement without pain or aggravation and prevent glenohumeral capsular restriction. Briefly discussed why circumferential measurements were taken this date as she will be having a sentinel lymph node biopsy which will place her at low risk for lymphedema.  Educated pt to the lymphatic system and informed that lymphedema risk will be discussed more fully at her follow up visit    TREATMENT   Precautions:    Pain: 0/10    X in shaded column indicates activity completed today   Modalities Parameters/  Location  Notes         Manual Therapy Time/Technique  Notes         Exercise/Intervention   Notes            Specific Interventions Next Treatment: PORi protocol for gentle manual lymphatic drainage and myofascial release, gentle stretches, strengthening, conditioning, balance, posture, lymphedema education as needed    Activity Tolerance: Patient tolerated treatment well    ASSESSMENT:  Assessment: Pt presents with poor posture. She will be having a R lumpectomy or mastectomy, pending genetics, which will place her at risk for decrease in her posture, shoulder mobility, strength and function as well as potentially lymphedema. She would benefit from skilled PT/Oncology Rehab to allow optimize surgical recovery and reduce risk of secondary complications from further oncologic treatment to allow full return to her previous level of activities for quality of life with survivorship. Body Structures/Functions/Activity Limitations:Impaired posture  Prognosis: good    GOALS:  Patient Goal: Get back to normal after surgery    Short Term Goals to be met in 12 weeks:  1. See LTGs    Long Term Goals to be met in 12 weeks:   1. Pt to demo right shoulder PROM flexion returned to 180 deg after surgery for ADLs. 2. Pt to demo right shoulder PROM abduction returned to 180 deg after surgery for ADLs. 3. Pt to demo right shoulder strength returned to 4+/5 after surgery for IADLs. 4. Pt to demo QuickDASH score returned to 0% after surgery for return to previous level of functioning for survivorship. 5. Pt to demo right UE lymphedema measures controlled at <131.6 cm with independence with lymphedema risk reduction strategies for safety after discharge. Patient Education: Plan of care, goals. See \"Treatment Initiated\" for further details.   Education Outcome: Verbalized understanding  Education Barriers: None    PLAN:  Treatment Recommendations: Strengthening, Range of Motion, Conditioning, Lymphedema Management, Manual Techniques, Pain Management, Neuropathy Management, Postural Re-Training, Body Mechanics/Ergonomics, Home Exercise Prescription, and Safety Education    Plan of care initiated. Plan to see patient up to 2 times per week for up to 12 weeks to address the treatment planned outlined above, with next appointment to be 2-3 weeks following surgery per protocol.     Time In 1435   Time Out 1530   Timed Code Minutes: 15 min   Total Treatment Time: 55 min       Electronically Signed by: Elaina Root, PT PT, DPT, [facility] [id number] 8/2/2021

## 2021-08-04 ENCOUNTER — TELEPHONE (OUTPATIENT)
Dept: ONCOLOGY | Age: 56
End: 2021-08-04

## 2021-08-04 NOTE — TELEPHONE ENCOUNTER
3 Formerly named [facility]   Hereditary Cancer Risk Assessment     Name: Shawnee Rosales  YOB: 1965  Date of Results Disclosure: 08/04/21      HISTORY   Ms. Carmela Fournier was seen for genetic counseling at the request of Simran Toure MD due to her personal and family history of breast cancer. At that time, Ms. Carmela Fournier chose to pursue genetic testing via the CancerNext Expanded + RNA gene panel. These results were discussed with Ms. Carmela Fournier via telephone. A summary of Ms. Lemon's results and recommendations are below. RESULTS  Radario Scopis CancerNext-Expanded Panel + RNAinsight: NEGATIVE - NO CLINICALLY SIGNIFICANT MUTATIONS DETECTED   This panel included the analysis of 77 genes associated with hereditary cancer including: AIP, ALK, APC, RYANNE, BAP1, BARD1, BLM, BMPR1A, BRCA1, BRCA2, BRIP1, CDC73, CDH1, CDK4, CDKN1B, CDKN2A, CHEK2, CTNNA1, DICER1, EGFR, EGLN1, EPCAM, FANCC, FH, FLCN, GALNT12, GREM1, HOXB13, KIF1B, KIT1, LZTR1, MAX, MEN1, MET, MITF, MLH1, MSH2, MSH3, MSH6, MUTYH, NBN, NF1, NF2, NTHL1, PALB2, PDGFRA, PHOX2B, PMS2, POLD1, POLE, POT1, EYJLD3O, PTCH1, PTEN, RAD51C, RAD51D, RB1, RECQL, RET, SDHA, SDHAF2, SDHB, SDHC, ,SDHD, SMAD4, SMARCA4, SMARCB1, SMARCE1, STK11, SUFU, ILGP014, TP53, TSC1, TSC2, VHL, and XRCC2. In addition, no clinically relevant aberrant RNA transcripts were detected in select genes. Please refer to genetic test report for technical details. We discussed that Ms. Lemon's negative test result greatly reduces the likelihood that her personal history of cancer is due to a hereditary mutation. It is possible that her personal history of cancer may be due to a gene for which testing was not performed or which has yet to be discovered. RECOMMENDATIONS  1) The outcome of Ms. Edgars genetic test results do not affect her current cancer treatment. Ms. Carmela Fournier should continue cancer treatment and surveillance as directed by her physicians.     2) Ms. Camrela Fournier should continue general population cancer screening guidelines as directed by her physicians. RECOMMENDATIONS FOR FAMILY MEMBERS   1) Genetic testing is not recommended for Ms. Lemon's children based on her negative test results. However, this recommendation does not take into consideration any family history of cancer in their paternal family. 2) It is possible that the cancers in Ms. Lemon's family are due to a hereditary gene mutation that she did not inherit. Therefore, her relatives (particularly those with a current or previous cancer diagnosis) may consider genetic counseling and testing to clarify this possibility. Relatives may contact the [address] [place] [address] at [phone] or locate a genetic counselor at www. [email or web address]. [email or web address].     3) We encourage Ms. Lemon's relatives to discuss their family history of cancer with their physicians to determine the most appropriate cancer screening recommendations. Ms. Alecia Amezcua female relatives may benefit from a formal breast cancer risk assessment by the Tennie Tay or Alejandro Cater risk models to determine if additional breast cancer screening is warranted. SUMMARY & PLAN   1) Ms. Edgars genetic test results are negative meaning there were no clinically significant mutations detected in the 77 genes analyzed. 2) There are no changes in medical management for Ms. Jessica Ellington based on her negative genetic test results. 3) We encourage Ms. Jessica Ellington to contact us every 1-2 years to determine if there are any new genetic testing or research options available. 4) We encourage Ms. Jessica Ellington to contact us with updates to her personal and/or familys cancer history as this information may alter our assessment and/or recommendations. The [facility] would be glad to offer our assistance should you have any questions or concerns about this information. Please feel free to contact us at [phone]. Kay Stockton.  Jim Singh MS, [facility]   Licensed Genetic Counselor         CC:  Ms. Virginia Carcamo MD

## 2021-08-09 ENCOUNTER — HOSPITAL ENCOUNTER (OUTPATIENT)
Dept: WOMENS IMAGING | Age: 56
Discharge: HOME | End: 2021-08-09
Payer: COMMERCIAL

## 2021-08-09 DIAGNOSIS — C50.911 DUCTAL CARCINOMA OF RIGHT BREAST (HCC): ICD-10-CM

## 2021-08-09 DIAGNOSIS — D05.11 DUCTAL CARCINOMA IN SITU (DCIS) OF RIGHT BREAST: ICD-10-CM

## 2021-08-09 PROCEDURE — 2709999900 US PLACE BREAST LOC DEVICE 1ST LESION RIGHT

## 2021-08-09 PROCEDURE — 2709999900 MAM NEEDLE BREAST LOCALIZATION RIGHT

## 2021-08-09 PROCEDURE — 77065 DX MAMMO INCL CAD UNI: CPT

## 2021-08-11 ENCOUNTER — ANESTHESIA (OUTPATIENT)
Dept: OPERATING ROOM | Age: 56
End: 2021-08-11
Payer: COMMERCIAL

## 2021-08-11 ENCOUNTER — ANESTHESIA EVENT (OUTPATIENT)
Dept: OPERATING ROOM | Age: 56
End: 2021-08-11
Payer: COMMERCIAL

## 2021-08-11 ENCOUNTER — HOSPITAL ENCOUNTER (OUTPATIENT)
Dept: WOMENS IMAGING | Age: 56
Discharge: HOME | End: 2021-08-11
Payer: COMMERCIAL

## 2021-08-11 ENCOUNTER — HOSPITAL ENCOUNTER (OUTPATIENT)
Age: 56
Setting detail: OUTPATIENT SURGERY
Discharge: HOME | End: 2021-08-11
Attending: SURGERY | Admitting: SURGERY
Payer: COMMERCIAL

## 2021-08-11 VITALS
OXYGEN SATURATION: 100 % | DIASTOLIC BLOOD PRESSURE: 56 MMHG | SYSTOLIC BLOOD PRESSURE: 91 MMHG | RESPIRATION RATE: 17 BRPM

## 2021-08-11 VITALS
OXYGEN SATURATION: 100 % | TEMPERATURE: 97.7 F | BODY MASS INDEX: 22.47 KG/M2 | HEART RATE: 59 BPM | DIASTOLIC BLOOD PRESSURE: 66 MMHG | HEIGHT: 61 IN | SYSTOLIC BLOOD PRESSURE: 102 MMHG | WEIGHT: 119 LBS | RESPIRATION RATE: 16 BRPM

## 2021-08-11 DIAGNOSIS — D05.11 DUCTAL CARCINOMA IN SITU (DCIS) OF RIGHT BREAST: ICD-10-CM

## 2021-08-11 DIAGNOSIS — D05.11 DUCTAL CARCINOMA IN SITU (DCIS) OF RIGHT BREAST: Primary | ICD-10-CM

## 2021-08-11 PROCEDURE — 76098 X-RAY EXAM SURGICAL SPECIMEN: CPT

## 2021-08-11 PROCEDURE — 7100000011 HC PHASE II RECOVERY - ADDTL 15 MIN: Performed by: SURGERY

## 2021-08-11 PROCEDURE — 88307 TISSUE EXAM BY PATHOLOGIST: CPT

## 2021-08-11 PROCEDURE — 7100000010 HC PHASE II RECOVERY - FIRST 15 MIN: Performed by: SURGERY

## 2021-08-11 PROCEDURE — 6360000002 HC RX W HCPCS: Performed by: SURGERY

## 2021-08-11 PROCEDURE — 3700000001 HC ADD 15 MINUTES (ANESTHESIA): Performed by: SURGERY

## 2021-08-11 PROCEDURE — 3700000000 HC ANESTHESIA ATTENDED CARE: Performed by: SURGERY

## 2021-08-11 PROCEDURE — 2709999900 HC NON-CHARGEABLE SUPPLY: Performed by: SURGERY

## 2021-08-11 PROCEDURE — 3600000012 HC SURGERY LEVEL 2 ADDTL 15MIN: Performed by: SURGERY

## 2021-08-11 PROCEDURE — 2580000003 HC RX 258: Performed by: SURGERY

## 2021-08-11 PROCEDURE — 88342 IMHCHEM/IMCYTCHM 1ST ANTB: CPT

## 2021-08-11 PROCEDURE — 3600000002 HC SURGERY LEVEL 2 BASE: Performed by: SURGERY

## 2021-08-11 PROCEDURE — 19301 PARTIAL MASTECTOMY: CPT | Performed by: SURGERY

## 2021-08-11 PROCEDURE — 2500000003 HC RX 250 WO HCPCS: Performed by: NURSE ANESTHETIST, CERTIFIED REGISTERED

## 2021-08-11 PROCEDURE — 6360000002 HC RX W HCPCS: Performed by: NURSE ANESTHETIST, CERTIFIED REGISTERED

## 2021-08-11 PROCEDURE — 2500000003 HC RX 250 WO HCPCS: Performed by: SURGERY

## 2021-08-11 RX ORDER — DEXAMETHASONE SODIUM PHOSPHATE 4 MG/ML
INJECTION, SOLUTION INTRA-ARTICULAR; INTRALESIONAL; INTRAMUSCULAR; INTRAVENOUS; SOFT TISSUE PRN
Status: DISCONTINUED | OUTPATIENT
Start: 2021-08-11 | End: 2021-08-11 | Stop reason: SDUPTHER

## 2021-08-11 RX ORDER — SODIUM CHLORIDE 9 MG/ML
INJECTION, SOLUTION INTRAVENOUS CONTINUOUS
Status: DISCONTINUED | OUTPATIENT
Start: 2021-08-11 | End: 2021-08-11 | Stop reason: HOSPADM

## 2021-08-11 RX ORDER — ACETAMINOPHEN 325 MG/1
650 TABLET ORAL EVERY 4 HOURS PRN
Status: DISCONTINUED | OUTPATIENT
Start: 2021-08-11 | End: 2021-08-11 | Stop reason: HOSPADM

## 2021-08-11 RX ORDER — SODIUM CHLORIDE 9 MG/ML
25 INJECTION, SOLUTION INTRAVENOUS PRN
Status: DISCONTINUED | OUTPATIENT
Start: 2021-08-11 | End: 2021-08-11 | Stop reason: HOSPADM

## 2021-08-11 RX ORDER — LIDOCAINE HYDROCHLORIDE 20 MG/ML
INJECTION, SOLUTION INFILTRATION; PERINEURAL PRN
Status: DISCONTINUED | OUTPATIENT
Start: 2021-08-11 | End: 2021-08-11 | Stop reason: SDUPTHER

## 2021-08-11 RX ORDER — SODIUM CHLORIDE 0.9 % (FLUSH) 0.9 %
5-40 SYRINGE (ML) INJECTION PRN
Status: DISCONTINUED | OUTPATIENT
Start: 2021-08-11 | End: 2021-08-11 | Stop reason: HOSPADM

## 2021-08-11 RX ORDER — MORPHINE SULFATE 2 MG/ML
4 INJECTION, SOLUTION INTRAMUSCULAR; INTRAVENOUS
Status: DISCONTINUED | OUTPATIENT
Start: 2021-08-11 | End: 2021-08-11 | Stop reason: HOSPADM

## 2021-08-11 RX ORDER — HYDROCODONE BITARTRATE AND ACETAMINOPHEN 5; 325 MG/1; MG/1
1 TABLET ORAL EVERY 4 HOURS PRN
Qty: 18 TABLET | Refills: 0 | Status: SHIPPED | OUTPATIENT
Start: 2021-08-11 | End: 2021-08-14

## 2021-08-11 RX ORDER — MORPHINE SULFATE 2 MG/ML
2 INJECTION, SOLUTION INTRAMUSCULAR; INTRAVENOUS
Status: DISCONTINUED | OUTPATIENT
Start: 2021-08-11 | End: 2021-08-11 | Stop reason: HOSPADM

## 2021-08-11 RX ORDER — SODIUM CHLORIDE 0.9 % (FLUSH) 0.9 %
5-40 SYRINGE (ML) INJECTION EVERY 12 HOURS SCHEDULED
Status: DISCONTINUED | OUTPATIENT
Start: 2021-08-11 | End: 2021-08-11 | Stop reason: HOSPADM

## 2021-08-11 RX ORDER — ONDANSETRON 2 MG/ML
INJECTION INTRAMUSCULAR; INTRAVENOUS PRN
Status: DISCONTINUED | OUTPATIENT
Start: 2021-08-11 | End: 2021-08-11 | Stop reason: SDUPTHER

## 2021-08-11 RX ORDER — ONDANSETRON 2 MG/ML
4 INJECTION INTRAMUSCULAR; INTRAVENOUS EVERY 6 HOURS PRN
Status: DISCONTINUED | OUTPATIENT
Start: 2021-08-11 | End: 2021-08-11 | Stop reason: HOSPADM

## 2021-08-11 RX ORDER — HYDROCODONE BITARTRATE AND ACETAMINOPHEN 5; 325 MG/1; MG/1
1 TABLET ORAL EVERY 4 HOURS PRN
Status: DISCONTINUED | OUTPATIENT
Start: 2021-08-11 | End: 2021-08-11 | Stop reason: HOSPADM

## 2021-08-11 RX ORDER — ONDANSETRON 4 MG/1
4 TABLET, ORALLY DISINTEGRATING ORAL EVERY 8 HOURS PRN
Status: DISCONTINUED | OUTPATIENT
Start: 2021-08-11 | End: 2021-08-11 | Stop reason: HOSPADM

## 2021-08-11 RX ORDER — LIDOCAINE HYDROCHLORIDE AND EPINEPHRINE 10; 10 MG/ML; UG/ML
INJECTION, SOLUTION INFILTRATION; PERINEURAL PRN
Status: DISCONTINUED | OUTPATIENT
Start: 2021-08-11 | End: 2021-08-11 | Stop reason: ALTCHOICE

## 2021-08-11 RX ORDER — PROPOFOL 10 MG/ML
INJECTION, EMULSION INTRAVENOUS PRN
Status: DISCONTINUED | OUTPATIENT
Start: 2021-08-11 | End: 2021-08-11 | Stop reason: SDUPTHER

## 2021-08-11 RX ORDER — KETOROLAC TROMETHAMINE 30 MG/ML
INJECTION, SOLUTION INTRAMUSCULAR; INTRAVENOUS PRN
Status: DISCONTINUED | OUTPATIENT
Start: 2021-08-11 | End: 2021-08-11 | Stop reason: SDUPTHER

## 2021-08-11 RX ORDER — HYDROCODONE BITARTRATE AND ACETAMINOPHEN 5; 325 MG/1; MG/1
2 TABLET ORAL EVERY 4 HOURS PRN
Status: DISCONTINUED | OUTPATIENT
Start: 2021-08-11 | End: 2021-08-11 | Stop reason: HOSPADM

## 2021-08-11 RX ORDER — FENTANYL CITRATE 50 UG/ML
INJECTION, SOLUTION INTRAMUSCULAR; INTRAVENOUS PRN
Status: DISCONTINUED | OUTPATIENT
Start: 2021-08-11 | End: 2021-08-11 | Stop reason: SDUPTHER

## 2021-08-11 RX ORDER — BUPIVACAINE HYDROCHLORIDE AND EPINEPHRINE 5; 5 MG/ML; UG/ML
INJECTION, SOLUTION EPIDURAL; INTRACAUDAL; PERINEURAL PRN
Status: DISCONTINUED | OUTPATIENT
Start: 2021-08-11 | End: 2021-08-11 | Stop reason: ALTCHOICE

## 2021-08-11 RX ADMIN — FENTANYL CITRATE 50 MCG: 50 INJECTION, SOLUTION INTRAMUSCULAR; INTRAVENOUS at 09:34

## 2021-08-11 RX ADMIN — CEFAZOLIN 2000 MG: 10 INJECTION, POWDER, FOR SOLUTION INTRAVENOUS at 09:37

## 2021-08-11 RX ADMIN — PROPOFOL 30 MG: 10 INJECTION, EMULSION INTRAVENOUS at 09:35

## 2021-08-11 RX ADMIN — KETOROLAC TROMETHAMINE 15 MG: 30 INJECTION, SOLUTION INTRAMUSCULAR; INTRAVENOUS at 10:09

## 2021-08-11 RX ADMIN — FENTANYL CITRATE 25 MCG: 50 INJECTION, SOLUTION INTRAMUSCULAR; INTRAVENOUS at 10:06

## 2021-08-11 RX ADMIN — PROPOFOL 150 MCG/KG/MIN: 10 INJECTION, EMULSION INTRAVENOUS at 09:36

## 2021-08-11 RX ADMIN — SODIUM CHLORIDE: 9 INJECTION, SOLUTION INTRAVENOUS at 09:37

## 2021-08-11 RX ADMIN — FENTANYL CITRATE 25 MCG: 50 INJECTION, SOLUTION INTRAMUSCULAR; INTRAVENOUS at 09:45

## 2021-08-11 RX ADMIN — ONDANSETRON HYDROCHLORIDE 4 MG: 4 INJECTION, SOLUTION INTRAMUSCULAR; INTRAVENOUS at 09:55

## 2021-08-11 RX ADMIN — DEXAMETHASONE SODIUM PHOSPHATE 5 MG: 4 INJECTION, SOLUTION INTRAMUSCULAR; INTRAVENOUS at 09:42

## 2021-08-11 RX ADMIN — LIDOCAINE HYDROCHLORIDE 50 MG: 20 INJECTION, SOLUTION INFILTRATION; PERINEURAL at 09:36

## 2021-08-11 ASSESSMENT — PULMONARY FUNCTION TESTS
PIF_VALUE: 1

## 2021-08-11 ASSESSMENT — PAIN SCALES - GENERAL: PAINLEVEL_OUTOF10: 0

## 2021-08-11 ASSESSMENT — PAIN - FUNCTIONAL ASSESSMENT: PAIN_FUNCTIONAL_ASSESSMENT: 0-10

## 2021-08-11 NOTE — BRIEF OP NOTE
Brief Postoperative Note      Patient: Evelyn Rinaldi  YOB: 1965  MRN: [id number]    Date of Procedure: 8/11/2021    Pre-Op Diagnosis: DCIS RIGHT BREAST    Post-Op Diagnosis: Same       Procedure(s):  RIGHT BREAST LUMPECTOMY, PREOP MAG SEED PLACEMENT    Surgeon(s):  Nuha Chow MD    Assistant:  First Assistant: Jim Ramirez RN    Anesthesia: Monitor Anesthesia Care    Estimated Blood Loss (mL): Minimal    Complications: None    Specimens:   ID Type Source Tests Collected by Time Destination   A : Right Breast Mass  Tissue Breast 1 Soy Amaro MD 8/11/2021 3073        Implants:  * No implants in log *      Drains: * No LDAs found *    Findings:     Electronically signed by Nuha Chow MD on 8/11/2021 at 10:08 AM

## 2021-08-11 NOTE — H&P
[id number] [id number]  History and Physical Update    Pt Name: Montserrat Alcantar  MRN: [id number]  YOB: 1965  Date of evaluation: 8/11/2021    [x] I have examined the patient and reviewed the H&P/Consult and there are no changes to the patient or plans. [] I have examined the patient and reviewed the H&P/Consult and have noted the following changes:        Edison Davies MD MD  Electronically signed 8/11/2021 at 8:27 Nroma Jeffries MD   General Surgery  New Patient Evaluation in Office  Pt Name: Montserrat Alcantar  Date of Birth 1965   Today's Date: 7/19/2021  Medical Record Number: [id number]  Referring Provider: Renata Gannon*  Primary Care Provider: ANNA Patel - CNP  Chief Complaint:       Chief Complaint   Patient presents with    Surgical Consult       new patient--ref by [address] [place] [address] for right breast DCIS-MRI breast 7/16      Stage 0 high-grade ER negative, OK negative  ASSESSMENT   1. Ductal carcinoma of right breast ([facility] [address]    2. Pre-op testing    3. Dense breast tissue on mammogram       PLANS     1. Bilateral breast MRI prior to any surgical intervention due to very dense breasts on mammography and clinical examination. MRI performed and no suspicious findings. Discussed with patient DCIS is not well seen on MRI. 2.  Genetic testing and counseling. Patient meets criteria. She wishes to proceed. 3.  Treatment of DCIS discussed with patient and her . Discussed reasoning for MRI as with her dense breast tissue could hide an additional lesion. Breast conservation with lumpectomy and postoperative radiation versus mastectomy with and without reconstruction were discussed. Results of genetic testing would likely alter her surgical decision making process. Sister with a history of breast cancer at age 40.  3.  Preoperative routine testing.   5.  Schedule patient for left partial mastectomy with magnetic seed localization pending results of additional testing. Osmel Stoddard is a 55y.o. 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 Estefany  denied any breast symptoms and presented for screening mammography. She has very, extremely dense breast which lowers the sensitivity of her mammography. There was a possible cluster of calcifications in the upper outer breast posterior depth. On diagnostic imaging there is a 1 cm cluster of pleomorphic calcifications in the posterior depth. There is a second cluster just inferior and posterior to the first cluster. She underwent stereotactic core biopsy which revealed a high-grade ductal carcinoma in situ which was ER and IL negative. Patient had not had previous breast biopsies. Her sister was diagnosed with triple negative breast cancer at age 40. Her sister underwent genetic testing and no deleterious mutation was identified. She was treated at [address] [place] [address] has no significant medical comorbidities.     Menarche age 16. . She had her children at age 33 and 28. She breast-fed both children. She took oral contraceptive medications for 15 years. Menopause age 47 no history of estrogen replacement therapy. Sister 40 history of breast cancer. Father history of prostate and lung cancer.   Paternal grandmother history of uterine cancer paternal cousin history of ovarian cancer at a very young age.     Past Medical History  Past Medical History        Past Medical History:   Diagnosis Date    Breast cancer ([address] 2021     right    COVID-19 2020          Past Surgical History  Past Surgical History         Past Surgical History:   Procedure Laterality Date    CHOLECYSTECTOMY        Dr Lise Soliz   2017     Dr. Daisha JETT STEROTACTIC LOC BREAST BIOPSY RIGHT Right 2021     MALIHA STEROTACTIC LOC BREAST BIOPSY RIGHT 7/12/2021 Juan Diego Gracia MD [facility]          Medications  Current Facility-Administered Medications          Current Outpatient Medications   Medication Sig Dispense Refill    Omega-3 Fatty Acids (SM FISH OIL PO) Take by mouth daily        MAGNESIUM PO Take by mouth daily          No current facility-administered medications for this visit.         Allergies   No Known Allergies    Family History  Family History         Family History   Problem Relation Age of Onset    Heart Disease Mother           with stents    Prostate Cancer Father           in his 63's   [facility] Lung Cancer Father      Breast Cancer Sister 40         bilat    No Known Problems Sister      No Known Problems Sister      No Known Problems Sister      No Known Problems Sister      Heart Disease Maternal Grandmother      Alzheimer's Disease Maternal Grandfather      Uterine Cancer Paternal Grandmother           >50    No Known Problems Paternal Grandfather      Ovarian Cancer Paternal Cousin 25          SocialHistory  Social History               Socioeconomic History    Marital status:        Spouse name: Not on file    Number of children: 2    Years of education: Not on file    Highest education level: Not on file   Occupational History    Not on file   Tobacco Use    Smoking status: Never Smoker    Smokeless tobacco: Never Used   Vaping Use    Vaping Use: Never used   Substance and Sexual Activity    Alcohol use: Yes       Comment: occasionally    Drug use: Not on file    Sexual activity: Not on file   Other Topics Concern    Not on file   Social History Narrative    Not on file      Social Determinants of Health          Financial Resource Strain:     Difficulty of Paying Living Expenses:    Food Insecurity:     Worried About Running Out of Food in the Last Year:     [address] in the Last Year:    Transportation Needs:     Lack of Transportation (Medical):      Lack of Transportation (Non-Medical):    Physical Activity:     Days of Exercise per Week:     Minutes of Exercise per Session:    Stress:     Feeling of Stress :    Social Connections:     Frequency of Communication with Friends and Family:     Frequency of Social Gatherings with Friends and Family:     Attends [employer] Services:     Active Member of Clubs or Organizations:     Attends Club or Organization Meetings:     Marital Status:    Intimate Partner Violence:     Fear of Current or Ex-Partner:     Emotionally Abused:     Physically Abused:     Sexually Abused:               Review of Systems  Review of Systems   Constitutional: Negative for activity change, appetite change, chills, fatigue, fever and unexpected weight change. HENT: Negative for congestion, hearing loss and sore throat. Eyes: Negative for visual disturbance. Respiratory: Negative for cough, shortness of breath and wheezing. Cardiovascular: Negative for chest pain and palpitations. Gastrointestinal: Negative for abdominal pain, blood in stool, nausea and vomiting. Endocrine: Negative for cold intolerance, heat intolerance and polydipsia. Genitourinary: Negative for dysuria, flank pain and hematuria. Musculoskeletal: Negative for arthralgias, gait problem, joint swelling and myalgias. Skin: Negative for color change and rash. Allergic/Immunologic: Negative for immunocompromised state. Neurological: Negative for dizziness, tremors, seizures and speech difficulty. Hematological: Does not bruise/bleed easily. Psychiatric/Behavioral: Negative for behavioral problems and confusion.         OBJECTIVE      BP (!) 118/58 (Site: Right Upper Arm, Position: Sitting, Cuff Size: Medium Adult)   Pulse 74   Temp 97.3 °F (36.3 °C) (Temporal)   Resp 18   Ht 5' 1\" (1.549 m)   Wt 118 lb 11.2 oz (53.8 kg)   SpO2 93%   BMI 22.43 kg/m²       Physical Exam  Vitals reviewed. Constitutional:       Appearance: Normal appearance.  She is well-developed. She is not ill-appearing. HENT:      Head: Normocephalic and atraumatic. Nose: No congestion. Eyes:      General: No scleral icterus. Extraocular Movements: Extraocular movements intact. Pupils: Pupils are equal, round, and reactive to light. Neck:      Vascular: No JVD. Trachea: No tracheal deviation. Cardiovascular:      Rate and Rhythm: Normal rate and regular rhythm. Heart sounds: Normal heart sounds. No murmur heard. Pulmonary:      Effort: Pulmonary effort is normal. No respiratory distress. Breath sounds: Normal breath sounds. No wheezing. Chest:      Chest wall: No tenderness. Breasts:         Right: No swelling, bleeding, inverted nipple, mass, nipple discharge, skin change or tenderness. Left: No swelling, bleeding, inverted nipple, mass, nipple discharge, skin change or tenderness. Abdominal:      General: There is no distension. Palpations: There is no mass. Tenderness: There is no abdominal tenderness. Musculoskeletal:         General: No deformity. Cervical back: Neck supple. No rigidity or tenderness. Right lower leg: No edema. Left lower leg: No edema. Lymphadenopathy:      Cervical: No cervical adenopathy. Right cervical: No superficial, deep or posterior cervical adenopathy. Left cervical: No superficial, deep or posterior cervical adenopathy. Upper Body:      Right upper body: No supraclavicular, axillary or pectoral adenopathy. Left upper body: No supraclavicular, axillary or pectoral adenopathy. Skin:     General: Skin is warm and dry. Coloration: Skin is not jaundiced or pale. Findings: No rash. Neurological:      General: No focal deficit present. Mental Status: She is alert and oriented to person, place, and time. Cranial Nerves: No cranial nerve deficit. Psychiatric:         Behavior: Behavior normal.         Thought Content:  Thought content in antigen retrieval:  30 minutes    Detection system type:   DAB Ultraview kit     B.  Right breast, upper outer quadrant calcifications \"B\", coil clip,   biopsy:    Benign breast tissue with glandular microcalcifications. Specimen:   A) CORE NEEDLE BREAST BIOPSY, RIGHT CALC, UOQ, COIL CLIP   B) CORE NEEDLE BREAST BIOPSY, RIGHT CALC, UOQ, COIL CLIP       Gross Examination:   A - The container is labeled Randell Gallus, right breast,   calcifications, upper outer quadrant, coil clip, A.  Received in   formalin are multiple bits of yellow-white fibrofatty tissue   aggregating to about 2 x 1.8 x 0.2 cm.  1 ns. Fixative:  10% neutral buffered formalin   Tissue removal time:  08:46   Radiology time:  08:48   Tissue fixation time:  08:50   Total fixation time: 11 hours 10 minutes     B - The container is labeled Snowflakeus Hartus, right breast,   calcifications, coil clip, upper outer quadrant, B.  Received in   formalin are two fragments of yellow-white fibrofatty tissue, each   about 1 cm in greatest dimension.  1 ns.  PCF/DKR:[id number]     Fixative:  10% neutral buffered formalin   Tissue removal time:  08:46   Radiology time:  08:48   Tissue fixation time:  08:50   Total fixation time: 11 hours 10 minutes     Microscopic Examination:   A.  Sections show few ducts expanded by tumor cells containing enlarged   vesicular nuclei with eosinophilic nucleoli.  Occasional ducts are   distorted and irregular with periductal stromal fibrosis and a   lymphocytic infiltrate.  That said, histologic evidence of   microinvasive carcinoma is absent.  Immunohistochemistry for p63 and   CK5/6 highlight retained myoepithelial cells.  Controls are adequate.      Procedure: Needle biopsy   Specimen laterality: Right   Tumor site: Upper outer quadrant   Histologic type: Ductal carcinoma in situ   Architectural patterns: Solid   Nuclear grade: Grade III (high)   Necrosis: Present, focal   Microcalcifications: Present in DCIS and in enhancement anywhere in the breast. There is no suspicious morphology. No lesions are present.       Left breast: There are no areas of suspicious enhancement. There is no suspicious morphology. No lesions are present.       There are no abnormalities in the axilla, mediastinum or visualized bones. There do appear to be a few small simple cysts in the liver.                   Impression   No MR evidence of malignancy on either side. The biopsy-proven site of DCIS in the right upper outer quadrant does not have an MR correlate.           Surgical management is recommended.               BI-RADS CATEGORY 6: Known biopsy proven malignancy.       Management: Surgical excision when clinically appropriate           **This report has been created using voice recognition software. It may contain minor errors which are inherent in voice recognition technology. **       Final report electronically signed by Dr. Marjorie Baum on 7/16/2021 4:09 PM      MAL[facility]HA HAI DIGITAL SCREEN SELF REFERRAL W OR WO CAD BILATERAL (Order [id number])  Narrative   LOCATION: [facility]       PROCEDURE: MALIHA HAI DIGITAL SCREEN SELF REFERRAL W OR WO CAD BILATERAL       CLINICAL INFORMATION: Visit for screening mammogram. Tomosynthesis.       CLINICAL:     Self-referred screening mammogram   PATIENT MEDICAL HISTORY:  No relevant medical history has been documented for this patient. FAMILY HISTORY:   Family medical history includes breast cancer in sister. RISK VALUES: Tyrer-Cuzick 10yr.: 6.3%, Tyrer-Cuzick life:   19.7%       COMPARISON: 6/26/2020, 12/13/2019, 5/10/2019 and 5/7/2018.       TECHNIQUE: Bilateral CC and MLO views of the breasts were obtained.  3D tomosynthesis was utilized.   CAD was utilized.       BREAST COMPOSITION: The tissue of the breast(s) is extremely dense, which lowers the sensitivity of mammography.       FINDINGS: Dinorah Cheers is a possible cluster of calcifications in the upper outer right breast, posterior depth.        No other significant masses, calcifications, or other findings are seen in the breast(s).             Impression   Possible cluster of calcifications in the upper outer right breast. Magnification views and a right LM view are recommended.       The patient will be contacted by our department per protocol regarding additional imaging and scheduling.       .           BI-RADS CATEGORY 0: NEED ADDITIONAL EVALUATION AND/OR PRIOR MAMMOGRAMS FOR COMPARISON.       Management Recommendation: Recall for additional imaging.       The patient will be contacted by our department for additional imaging/scheduling.               **This report has been created using voice recognition software. It may contain minor errors which are inherent in voice recognition technology. **       Final report electronically signed by Dr. Jose Aldana on 6/30/2021 4:53 PM              LOCATION: [facility]       PROCEDURE: MALIHA HAI DIGITAL DIAGNOSTIC UNILATERAL RIGHT       CLINICAL INFORMATION: Microcalcification of right breast on mammogram . Tomosynthesis. Right breast calcifications seen on screening mammography. .       Tyrer-Cuzick calculation reports this patient's 10 year risk for developing breast cancer is 6.3% and lifetime risk is 19.7%.        COMPARISON: 6/30/2021, 6/26/2020, 12/13/2009, 5/10/2019 and 5/7/2018.       TECHNIQUE: Images of the right breast include a full field LM view and magnification CC, MLO and LM views. A magnification exaggerated cc was also obtained. . Tomosynthesis and CAD were utilized.        BREAST COMPOSITION: The tissue of the breast(s) is extremely dense, which lowers the sensitivity of mammography.       FINDINGS:        In the upper outer right breast there is a 1 cm cluster of pleomorphic calcifications. This is posterior depth. These are indeterminate.       There is a possible 2nd cluster of calcifications seen on the MLO and LM magnification view just inferior and posterior to this 1st cluster.  These are indeterminate. These are not readily seen on the cc view.       No other significant masses, calcifications, or other findings are seen in the breast(s).             Impression       1. Cluster of pleomorphic calcifications in the upper outer right breast posterior depth. A stereotactic guided biopsy is recommended.       2. 2nd cluster of possible calcifications are seen on the LM and MLO views only just inferior to the 1st cluster. Stereotactic guided biopsy of these calcifications is also recommended.       These results were discussed with the patient. The tech navigator was notified. We will arrange scheduling the patient for her procedure per department protocol.                   BI-RADS CATEGORY 4B - Intermediate suspicion for malignancy.       Management: Tissue diagnosis.  Biopsy should be performed in the absence of clinical contraindication.               **This report has been created using voice recognition software. It may contain minor errors which are inherent in voice recognition technology. **       Final report electronically signed by Dr. Avni Mcfarland on 7/2/2021 11:52 AM          [facility] HAI DIGITAL SCREEN SELF REFERRAL W OR WO CAD BILATERAL (Order [id number])  Narrative   LOCATION: [facility]       PROCEDURE: MALIHA HAI DIGITAL SCREEN SELF REFERRAL W OR WO CAD BILATERAL       CLINICAL INFORMATION: Visit for screening mammogram. Tomosynthesis.       CLINICAL:     Self-referred screening mammogram   PATIENT MEDICAL HISTORY:  No relevant medical history has been documented for this patient. FAMILY HISTORY:   Family medical history includes breast cancer in sister. RISK VALUES: Tyrer-Cuzick 10yr.: 6.3%, Tyrer-Cuzick life:   19.7%       COMPARISON: 6/26/2020, 12/13/2019, 5/10/2019 and 5/7/2018.       TECHNIQUE: Bilateral CC and MLO views of the breasts were obtained.  3D tomosynthesis was utilized.   CAD was utilized.       BREAST COMPOSITION: The tissue of the breast(s) is extremely dense, which lowers the sensitivity of mammography.       FINDINGS: Estrellita An is a possible cluster of calcifications in the upper outer right breast, posterior depth.        No other significant masses, calcifications, or other findings are seen in the breast(s).             Impression   Possible cluster of calcifications in the upper outer right breast. Magnification views and a right LM view are recommended.       The patient will be contacted by our department per protocol regarding additional imaging and scheduling.       .           BI-RADS CATEGORY 0: NEED ADDITIONAL EVALUATION AND/OR PRIOR MAMMOGRAMS FOR COMPARISON.       Management Recommendation: Recall for additional imaging.       The patient will be contacted by our department for additional imaging/scheduling.               **This report has been created using voice recognition software. It may contain minor errors which are inherent in voice recognition technology. **       Final report electronically signed by Dr. Rhonda Sandoval on 6/30/2021 4:53 PM

## 2021-08-11 NOTE — OP NOTE
[address] [id number]                                OPERATIVE REPORT    PATIENT NAME: Tank Kemp                     :        1965  MED REC NO:   [id number]                           ROOM:  ACCOUNT NO:   [de-identified]                           ADMIT DATE: 2021  PROVIDER:     Mani Sauceda M.D.    DATE OF PROCEDURE:  2021    PREOPERATIVE DIAGNOSIS:  Ductal carcinoma in situ upper outer quadrant  right breast, estrogen receptor positive, stage 0. POSTOPERATIVE DIAGNOSIS:  Ductal carcinoma in situ upper outer quadrant  right breast, estrogen receptor positive, stage 0. PROCEDURE:  Right partial mastectomy with magnetic seed localization,  utilization of Sentimag probe to localize. SURGEON:  Mani Sauceda MD    ANESTHESIA:  IV local sedation, monitored anesthesia care. ESTIMATED BLOOD LOSS:  Less than 10 mL. SPECIMEN TO PATHOLOGY:  [facility] [address] for specimen  radiograph. INDICATION:  See history and physical examination. DESCRIPTION OF PROCEDURE:  The patient was brought to the operating  room, placed supine on the operating table. She was given Ancef  intravenously. She was given sedation per the Anesthesia Department. Right breast had been marked by myself preoperatively, confirmed by the  patient. Timeout was performed. Utilizing Sentimag probe, clip was  localized to the upper outer quadrant of the right breast.  This guided  the skin incision. It was infiltrated with local anesthetic and skin  incision was made. The patient was small breasted. Dissected directly  beneath the skin and a lumpectomy was performed down to the pectoralis  muscle itself. Counts guided the lumpectomy. Specimen was oriented  with a MarginMap and using the probe, the seed was documented and to be  excised. Specimen was sent for imaging and did contain the Magseed.    Gross margins appeared adequate. Hemostasis was achieved with  electrocautery. The wound was irrigated with saline. Dermis was closed  with interrupted Vicryl suture and skin was closed with running  subcuticular 4-0 Monocryl suture. The wound was infiltrated with local  anesthetic. Skin glue was applied. The patient tolerated the procedure  well and transported to the recovery area.         Erendira Foster M.D.    D: 08/11/2021 10:16:48       T: 08/11/2021 10:18:51     [id number]  Job#: [id number]     Doc#: [id number]    CC:  Primary Care Provider

## 2021-08-11 NOTE — ANESTHESIA PRE PROCEDURE
Department of Anesthesiology  Preprocedure Note       Name:  Luiz Hong   Age:  55 y.o.  :  1965                                          MRN:  [id number]         Date:  2021      Surgeon: Aydee Benitez):  Tin Leos MD    Procedure: Procedure(s):  RIGHT BREAST LUMPECTOMY, PREOP MAG SEED PLACEMENT    Medications prior to admission:   Prior to Admission medications    Medication Sig Start Date End Date Taking? Authorizing Provider   Omega-3 Fatty Acids (SM FISH OIL PO) Take by mouth daily   Yes Historical Provider, MD   MAGNESIUM PO Take by mouth daily   Yes Historical Provider, MD       Current medications:    Current Facility-Administered Medications   Medication Dose Route Frequency Provider Last Rate Last Admin    0.9 % sodium chloride infusion   Intravenous Continuous Tin Leos MD        sodium chloride flush 0.9 % injection 5-40 mL  5-40 mL Intravenous 2 times per day Tin Leos MD        sodium chloride flush 0.9 % injection 5-40 mL  5-40 mL Intravenous PRN Tin Leos MD        0.9 % sodium chloride infusion  25 mL Intravenous PRN Tin Leos MD        ceFAZolin (ANCEF) 2000 mg in dextrose 5 % 50 mL IVPB  2,000 mg Intravenous On Call to [address] [place] [address] [place], [place]           Allergies:  No Known Allergies    Problem List:  There is no problem list on file for this patient.       Past Medical History:        Diagnosis Date    Breast cancer ([address] 2021    right    COVID-19 2020       Past Surgical History:        Procedure Laterality Date    CHOLECYSTECTOMY      Dr Alysia Aden  2017    Dr. Becker Boys [facility] STEROTACTIC LOC BREAST BIOPSY RIGHT Right 2021    [facility] STEROTACTIC LOC BREAST BIOPSY RIGHT 2021 Felicia Lockwood MD [address] GUIDED NEEDLE LOC OF RIGHT BREAST Right 2021    US GUIDED NEEDLE LOC OF RIGHT BREAST 2021 Jes Pruett MD [facility]       Social History:    Social History     Tobacco Use    Smoking status: Never Smoker    Smokeless tobacco: Never Used   Substance Use Topics    Alcohol use: Yes     Comment: occasionally                                Counseling given: Not Answered      Vital Signs (Current):   Vitals:    08/11/21 0801   BP: 107/68   Pulse: 62   Resp: 16   Temp: 98 °F (36.7 °C)   TempSrc: Temporal   SpO2: 99%   Weight: 119 lb (54 kg)   Height: 5' 1\" (1.549 m)                                              BP Readings from Last 3 Encounters:   08/11/21 107/68   07/19/21 (!) 118/58       NPO Status: Time of last liquid consumption: 1800                        Time of last solid consumption: 1800                        Date of last liquid consumption: 08/10/21                        Date of last solid food consumption: 08/10/21    BMI:   Wt Readings from Last 3 Encounters:   08/11/21 119 lb (54 kg)   07/19/21 118 lb 11.2 oz (53.8 kg)     Body mass index is 22.48 kg/m². CBC:   Lab Results   Component Value Date    WBC 5.1 07/22/2021    RBC 4.62 07/22/2021    HGB 14.8 07/22/2021    HCT 46.2 07/22/2021    .0 07/22/2021     07/22/2021       CMP:   Lab Results   Component Value Date     02/10/2021    K 3.9 02/10/2021     02/10/2021    CO2 27 02/10/2021    BUN 16 02/10/2021    CREATININE 0.7 02/10/2021    LABGLOM 87 02/10/2021    GLUCOSE 87 02/10/2021    PROT 7.0 02/10/2021    CALCIUM 9.8 02/10/2021    BILITOT 1.3 02/10/2021    ALKPHOS 49 02/10/2021    AST 18 02/10/2021    ALT 14 02/10/2021       POC Tests: No results for input(s): POCGLU, POCNA, POCK, POCCL, POCBUN, POCHEMO, POCHCT in the last 72 hours.     Coags: No results found for: PROTIME, INR, APTT    HCG (If Applicable): No results found for: PREGTESTUR, PREGSERUM, HCG, HCGQUANT     ABGs: No results found for: PHART, PO2ART, VHY7EKU, YRR4SUW, BEART, [id number]     Type & Screen (If Applicable):  No results found for: LABABO, LABRH    Drug/Infectious Status (If Applicable):  No results found for: HIV, HEPCAB    COVID-19 Screening (If Applicable): No results found for: COVID19        Anesthesia Evaluation  Patient summary reviewed and Nursing notes reviewed no history of anesthetic complications:   Airway: Mallampati: I        Dental:          Pulmonary: breath sounds clear to auscultation                             Cardiovascular:  Exercise tolerance: good (>4 METS),           Rhythm: regular  Rate: normal                    Neuro/Psych:               GI/Hepatic/Renal:             Endo/Other:                     Abdominal:             Vascular: Other Findings:             Anesthesia Plan      MAC     ASA 2       Induction: intravenous. MIPS: Postoperative opioids intended and Prophylactic antiemetics administered. Anesthetic plan and risks discussed with patient and spouse. Plan discussed with CRNA.                   Arleth Floyd DO   8/11/2021

## 2021-08-11 NOTE — ANESTHESIA POSTPROCEDURE EVALUATION
Department of Anesthesiology  Postprocedure Note    Patient: Florina Valles  MRN: [id number]  YOB: 1965  Date of evaluation: 8/11/2021  Time:  10:33 AM     Procedure Summary     Date: 08/11/21 Room / Location: [facility] 04 / [facility]    Anesthesia Start: 3253 Anesthesia Stop: 1943    Procedure: RIGHT BREAST LUMPECTOMY, PREOP MAG SEED PLACEMENT (Right ) Diagnosis: (DCIS RIGHT BREAST)    Surgeons: Salinas Tiwari MD Responsible Provider: Geraldine Mccray DO    Anesthesia Type: MAC ASA Status: 2          Anesthesia Type: MAC    Annamaria Phase I:      Annamaria Phase II: Annamaria Score: 10    Last vitals: Reviewed and per EMR flowsheets.        Anesthesia Post Evaluation    Patient location during evaluation: bedside  Patient participation: complete - patient participated  Level of consciousness: awake  Airway patency: patent  Nausea & Vomiting: no nausea  Complications: no  Cardiovascular status: hemodynamically stable  Respiratory status: acceptable  Hydration status: stable

## 2021-08-12 ENCOUNTER — TELEPHONE (OUTPATIENT)
Dept: SURGERY | Age: 56
End: 2021-08-12

## 2021-08-12 NOTE — TELEPHONE ENCOUNTER
Pt states she is feeling well this morning after surgery. States she is a little sore, but applying ice to the area as needed and taking ibuprofen/tylenol. Pt states she is urinating with no issues, and will take stool softeners/laxatives if needed for BM. Pt states that incisions are clean, dry and intact- denies any s/sx of infection. Advised to call the office with any questions or concerns.

## 2021-08-17 ENCOUNTER — ANESTHESIA EVENT (OUTPATIENT)
Dept: OPERATING ROOM | Age: 56
End: 2021-08-17
Payer: COMMERCIAL

## 2021-08-17 ENCOUNTER — ANESTHESIA (OUTPATIENT)
Dept: OPERATING ROOM | Age: 56
End: 2021-08-17
Payer: COMMERCIAL

## 2021-08-17 ENCOUNTER — HOSPITAL ENCOUNTER (OUTPATIENT)
Age: 56
Setting detail: OUTPATIENT SURGERY
Discharge: HOME | End: 2021-08-17
Attending: SURGERY | Admitting: SURGERY
Payer: COMMERCIAL

## 2021-08-17 VITALS
BODY MASS INDEX: 22.84 KG/M2 | TEMPERATURE: 97.2 F | WEIGHT: 121 LBS | RESPIRATION RATE: 14 BRPM | HEART RATE: 68 BPM | HEIGHT: 61 IN | SYSTOLIC BLOOD PRESSURE: 136 MMHG | DIASTOLIC BLOOD PRESSURE: 63 MMHG | OXYGEN SATURATION: 99 %

## 2021-08-17 VITALS
RESPIRATION RATE: 16 BRPM | SYSTOLIC BLOOD PRESSURE: 101 MMHG | OXYGEN SATURATION: 96 % | DIASTOLIC BLOOD PRESSURE: 51 MMHG

## 2021-08-17 PROCEDURE — 88307 TISSUE EXAM BY PATHOLOGIST: CPT

## 2021-08-17 PROCEDURE — 6360000002 HC RX W HCPCS: Performed by: SURGERY

## 2021-08-17 PROCEDURE — 3600000012 HC SURGERY LEVEL 2 ADDTL 15MIN: Performed by: SURGERY

## 2021-08-17 PROCEDURE — 19120 REMOVAL OF BREAST LESION: CPT | Performed by: SURGERY

## 2021-08-17 PROCEDURE — 3700000001 HC ADD 15 MINUTES (ANESTHESIA): Performed by: SURGERY

## 2021-08-17 PROCEDURE — 7100000011 HC PHASE II RECOVERY - ADDTL 15 MIN: Performed by: SURGERY

## 2021-08-17 PROCEDURE — 2500000003 HC RX 250 WO HCPCS: Performed by: SURGERY

## 2021-08-17 PROCEDURE — 3700000000 HC ANESTHESIA ATTENDED CARE: Performed by: SURGERY

## 2021-08-17 PROCEDURE — 3600000002 HC SURGERY LEVEL 2 BASE: Performed by: SURGERY

## 2021-08-17 PROCEDURE — 7100000010 HC PHASE II RECOVERY - FIRST 15 MIN: Performed by: SURGERY

## 2021-08-17 PROCEDURE — 2709999900 HC NON-CHARGEABLE SUPPLY: Performed by: SURGERY

## 2021-08-17 PROCEDURE — 2500000003 HC RX 250 WO HCPCS: Performed by: NURSE ANESTHETIST, CERTIFIED REGISTERED

## 2021-08-17 PROCEDURE — 2580000003 HC RX 258: Performed by: SURGERY

## 2021-08-17 PROCEDURE — 6360000002 HC RX W HCPCS: Performed by: NURSE ANESTHETIST, CERTIFIED REGISTERED

## 2021-08-17 RX ORDER — ACETAMINOPHEN 325 MG/1
650 TABLET ORAL EVERY 4 HOURS PRN
Status: DISCONTINUED | OUTPATIENT
Start: 2021-08-17 | End: 2021-08-17 | Stop reason: HOSPADM

## 2021-08-17 RX ORDER — BUPIVACAINE HYDROCHLORIDE 5 MG/ML
INJECTION, SOLUTION EPIDURAL; INTRACAUDAL PRN
Status: DISCONTINUED | OUTPATIENT
Start: 2021-08-17 | End: 2021-08-17 | Stop reason: ALTCHOICE

## 2021-08-17 RX ORDER — HYDROCODONE BITARTRATE AND ACETAMINOPHEN 5; 325 MG/1; MG/1
1 TABLET ORAL EVERY 4 HOURS PRN
Status: DISCONTINUED | OUTPATIENT
Start: 2021-08-17 | End: 2021-08-17 | Stop reason: HOSPADM

## 2021-08-17 RX ORDER — SODIUM CHLORIDE 0.9 % (FLUSH) 0.9 %
5-40 SYRINGE (ML) INJECTION EVERY 12 HOURS SCHEDULED
Status: DISCONTINUED | OUTPATIENT
Start: 2021-08-17 | End: 2021-08-17 | Stop reason: HOSPADM

## 2021-08-17 RX ORDER — SODIUM CHLORIDE 9 MG/ML
INJECTION, SOLUTION INTRAVENOUS CONTINUOUS
Status: DISCONTINUED | OUTPATIENT
Start: 2021-08-17 | End: 2021-08-17 | Stop reason: HOSPADM

## 2021-08-17 RX ORDER — MORPHINE SULFATE 2 MG/ML
4 INJECTION, SOLUTION INTRAMUSCULAR; INTRAVENOUS
Status: DISCONTINUED | OUTPATIENT
Start: 2021-08-17 | End: 2021-08-17 | Stop reason: HOSPADM

## 2021-08-17 RX ORDER — HYDROCODONE BITARTRATE AND ACETAMINOPHEN 5; 325 MG/1; MG/1
2 TABLET ORAL EVERY 4 HOURS PRN
Status: DISCONTINUED | OUTPATIENT
Start: 2021-08-17 | End: 2021-08-17 | Stop reason: HOSPADM

## 2021-08-17 RX ORDER — SODIUM CHLORIDE 9 MG/ML
25 INJECTION, SOLUTION INTRAVENOUS PRN
Status: DISCONTINUED | OUTPATIENT
Start: 2021-08-17 | End: 2021-08-17 | Stop reason: HOSPADM

## 2021-08-17 RX ORDER — ONDANSETRON 2 MG/ML
4 INJECTION INTRAMUSCULAR; INTRAVENOUS EVERY 6 HOURS PRN
Status: DISCONTINUED | OUTPATIENT
Start: 2021-08-17 | End: 2021-08-17 | Stop reason: HOSPADM

## 2021-08-17 RX ORDER — LIDOCAINE HYDROCHLORIDE 20 MG/ML
INJECTION, SOLUTION INFILTRATION; PERINEURAL PRN
Status: DISCONTINUED | OUTPATIENT
Start: 2021-08-17 | End: 2021-08-17 | Stop reason: SDUPTHER

## 2021-08-17 RX ORDER — LIDOCAINE HYDROCHLORIDE AND EPINEPHRINE 10; 10 MG/ML; UG/ML
INJECTION, SOLUTION INFILTRATION; PERINEURAL PRN
Status: DISCONTINUED | OUTPATIENT
Start: 2021-08-17 | End: 2021-08-17 | Stop reason: ALTCHOICE

## 2021-08-17 RX ORDER — ONDANSETRON 4 MG/1
4 TABLET, ORALLY DISINTEGRATING ORAL EVERY 8 HOURS PRN
Status: DISCONTINUED | OUTPATIENT
Start: 2021-08-17 | End: 2021-08-17 | Stop reason: HOSPADM

## 2021-08-17 RX ORDER — PROPOFOL 10 MG/ML
INJECTION, EMULSION INTRAVENOUS PRN
Status: DISCONTINUED | OUTPATIENT
Start: 2021-08-17 | End: 2021-08-17 | Stop reason: SDUPTHER

## 2021-08-17 RX ORDER — SODIUM CHLORIDE 0.9 % (FLUSH) 0.9 %
5-40 SYRINGE (ML) INJECTION PRN
Status: DISCONTINUED | OUTPATIENT
Start: 2021-08-17 | End: 2021-08-17 | Stop reason: HOSPADM

## 2021-08-17 RX ORDER — DEXAMETHASONE SODIUM PHOSPHATE 4 MG/ML
INJECTION, SOLUTION INTRA-ARTICULAR; INTRALESIONAL; INTRAMUSCULAR; INTRAVENOUS; SOFT TISSUE PRN
Status: DISCONTINUED | OUTPATIENT
Start: 2021-08-17 | End: 2021-08-17 | Stop reason: SDUPTHER

## 2021-08-17 RX ORDER — FENTANYL CITRATE 50 UG/ML
INJECTION, SOLUTION INTRAMUSCULAR; INTRAVENOUS PRN
Status: DISCONTINUED | OUTPATIENT
Start: 2021-08-17 | End: 2021-08-17 | Stop reason: SDUPTHER

## 2021-08-17 RX ORDER — ONDANSETRON 2 MG/ML
INJECTION INTRAMUSCULAR; INTRAVENOUS PRN
Status: DISCONTINUED | OUTPATIENT
Start: 2021-08-17 | End: 2021-08-17 | Stop reason: SDUPTHER

## 2021-08-17 RX ORDER — KETOROLAC TROMETHAMINE 30 MG/ML
INJECTION, SOLUTION INTRAMUSCULAR; INTRAVENOUS PRN
Status: DISCONTINUED | OUTPATIENT
Start: 2021-08-17 | End: 2021-08-17

## 2021-08-17 RX ORDER — MORPHINE SULFATE 2 MG/ML
2 INJECTION, SOLUTION INTRAMUSCULAR; INTRAVENOUS
Status: DISCONTINUED | OUTPATIENT
Start: 2021-08-17 | End: 2021-08-17 | Stop reason: HOSPADM

## 2021-08-17 RX ADMIN — SODIUM CHLORIDE: 9 INJECTION, SOLUTION INTRAVENOUS at 09:29

## 2021-08-17 RX ADMIN — ONDANSETRON HYDROCHLORIDE 4 MG: 4 INJECTION, SOLUTION INTRAMUSCULAR; INTRAVENOUS at 09:45

## 2021-08-17 RX ADMIN — LIDOCAINE HYDROCHLORIDE 100 MG: 20 INJECTION, SOLUTION INFILTRATION; PERINEURAL at 09:32

## 2021-08-17 RX ADMIN — DEXAMETHASONE SODIUM PHOSPHATE 5 MG: 4 INJECTION, SOLUTION INTRAMUSCULAR; INTRAVENOUS at 09:45

## 2021-08-17 RX ADMIN — PROPOFOL 50 MG: 10 INJECTION, EMULSION INTRAVENOUS at 09:33

## 2021-08-17 RX ADMIN — FENTANYL CITRATE 50 MCG: 50 INJECTION, SOLUTION INTRAMUSCULAR; INTRAVENOUS at 09:47

## 2021-08-17 RX ADMIN — CEFAZOLIN 2000 MG: 10 INJECTION, POWDER, FOR SOLUTION INTRAVENOUS at 09:30

## 2021-08-17 RX ADMIN — PROPOFOL 150 MCG/KG/MIN: 10 INJECTION, EMULSION INTRAVENOUS at 09:34

## 2021-08-17 RX ADMIN — FENTANYL CITRATE 50 MCG: 50 INJECTION, SOLUTION INTRAMUSCULAR; INTRAVENOUS at 09:34

## 2021-08-17 ASSESSMENT — PULMONARY FUNCTION TESTS
PIF_VALUE: 0

## 2021-08-17 ASSESSMENT — PAIN - FUNCTIONAL ASSESSMENT: PAIN_FUNCTIONAL_ASSESSMENT: 0-10

## 2021-08-17 ASSESSMENT — PAIN SCALES - GENERAL: PAINLEVEL_OUTOF10: 0

## 2021-08-17 NOTE — H&P
[address] [address]  History and Physical Update    Pt Name: Jeronimo Farley  MRN: [id number]  YOB: 1965  Date of evaluation: 8/17/2021    [] I have examined the patient and reviewed the H&P/Consult and there are no changes to the patient or plans. [x] I have examined the patient and reviewed the H&P/Consult and have noted the following changes: Focally positive medial margin reexcise       Katie Christy MD MD  Electronically signed 8/17/2021 at 9:33 AM     Katie Christy MD   General Surgery  New Patient Evaluation in Office  Pt Name: Jeronimo Farley  Date of Birth 1965   Today's Date: 7/19/2021  Medical Record Number: [id number]  Referring Provider: Reyna Bermudez*  Primary Care Provider: ANNA Tran - CNP  Chief Complaint:       Chief Complaint   Patient presents with    Surgical Consult       new patient--ref by [facility] for right breast DCIS-MRI breast 7/16      Stage 0 high-grade ER negative, ID negative  ASSESSMENT   1. Ductal carcinoma of right breast ([address]    2. Pre-op testing    3. Dense breast tissue on mammogram       PLANS     1. Bilateral breast MRI prior to any surgical intervention due to very dense breasts on mammography and clinical examination. MRI performed and no suspicious findings. Discussed with patient DCIS is not well seen on MRI. 2.  Genetic testing and counseling. Patient meets criteria. She wishes to proceed. 3.  Treatment of DCIS discussed with patient and her . Discussed reasoning for MRI as with her dense breast tissue could hide an additional lesion. Breast conservation with lumpectomy and postoperative radiation versus mastectomy with and without reconstruction were discussed. Results of genetic testing would likely alter her surgical decision making process. Sister with a history of breast cancer at age 40.  3.  Preoperative routine testing.   5.  Schedule patient for left partial mastectomy with magnetic seed localization pending results of additional testing. Venice Rouse is a 55y.o. 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 Alea Adair denied any breast symptoms and presented for screening mammography. She has very, extremely dense breast which lowers the sensitivity of her mammography. There was a possible cluster of calcifications in the upper outer breast posterior depth. On diagnostic imaging there is a 1 cm cluster of pleomorphic calcifications in the posterior depth. There is a second cluster just inferior and posterior to the first cluster. She underwent stereotactic core biopsy which revealed a high-grade ductal carcinoma in situ which was ER and GA negative. Patient had not had previous breast biopsies. Her sister was diagnosed with triple negative breast cancer at age 40. Her sister underwent genetic testing and no deleterious mutation was identified. She was treated at [address] [place] [address] has no significant medical comorbidities.     Menarche age 16. . She had her children at age 33 and 28. She breast-fed both children. She took oral contraceptive medications for 15 years. Menopause age 47 no history of estrogen replacement therapy. Sister 40 history of breast cancer. Father history of prostate and lung cancer.   Paternal grandmother history of uterine cancer paternal cousin history of ovarian cancer at a very young age.     Past Medical History  Past Medical History        Past Medical History:   Diagnosis Date    Breast cancer ([address] 2021     right    COVID-19 2020          Past Surgical History  Past Surgical History         Past Surgical History:   Procedure Laterality Date    CHOLECYSTECTOMY        Dr Edgar Porter   2017     Dr. Susan JETT STEROTACTIC LOC BREAST BIOPSY RIGHT Right 2021     MALIHA STEROTACTIC LOC BREAST BIOPSY RIGHT 7/12/2021 Virginia Luna MD [facility]          Medications  Current Facility-Administered Medications          Current Outpatient Medications   Medication Sig Dispense Refill    Omega-3 Fatty Acids (SM FISH OIL PO) Take by mouth daily        MAGNESIUM PO Take by mouth daily          No current facility-administered medications for this visit.         Allergies   No Known Allergies    Family History  Family History         Family History   Problem Relation Age of Onset    Heart Disease Mother           with stents    Prostate Cancer Father           in his 63's   Lilyan Montane Lung Cancer Father      Breast Cancer Sister 40         bilat    No Known Problems Sister      No Known Problems Sister      No Known Problems Sister      No Known Problems Sister      Heart Disease Maternal Grandmother      Alzheimer's Disease Maternal Grandfather      Uterine Cancer Paternal Grandmother           >50    No Known Problems Paternal Grandfather      Ovarian Cancer Paternal Cousin 25          SocialHistory  Social History               Socioeconomic History    Marital status:        Spouse name: Not on file    Number of children: 2    Years of education: Not on file    Highest education level: Not on file   Occupational History    Not on file   Tobacco Use    Smoking status: Never Smoker    Smokeless tobacco: Never Used   Vaping Use    Vaping Use: Never used   Substance and Sexual Activity    Alcohol use:  Yes       Comment: occasionally    Drug use: Not on file    Sexual activity: Not on file   Other Topics Concern    Not on file   Social History Narrative    Not on file      Social Determinants of Health          Financial Resource Strain:     Difficulty of Paying Living Expenses:    Food Insecurity:     Worried About Running Out of Food in the Last Year:     [address] in the Last Year:    Transportation Needs:     Lack of Transportation (Medical):  Lack of Transportation (Non-Medical):    Physical Activity:     Days of Exercise per Week:     Minutes of Exercise per Session:    Stress:     Feeling of Stress :    Social Connections:     Frequency of Communication with Friends and Family:     Frequency of Social Gatherings with Friends and Family:     Attends [employer] Services:     Active Member of Clubs or Organizations:     Attends Club or Organization Meetings:     Marital Status:    Intimate Partner Violence:     Fear of Current or Ex-Partner:     Emotionally Abused:     Physically Abused:     Sexually Abused:               Review of Systems  Review of Systems   Constitutional: Negative for activity change, appetite change, chills, fatigue, fever and unexpected weight change. HENT: Negative for congestion, hearing loss and sore throat. Eyes: Negative for visual disturbance. Respiratory: Negative for cough, shortness of breath and wheezing. Cardiovascular: Negative for chest pain and palpitations. Gastrointestinal: Negative for abdominal pain, blood in stool, nausea and vomiting. Endocrine: Negative for cold intolerance, heat intolerance and polydipsia. Genitourinary: Negative for dysuria, flank pain and hematuria. Musculoskeletal: Negative for arthralgias, gait problem, joint swelling and myalgias. Skin: Negative for color change and rash. Allergic/Immunologic: Negative for immunocompromised state. Neurological: Negative for dizziness, tremors, seizures and speech difficulty. Hematological: Does not bruise/bleed easily. Psychiatric/Behavioral: Negative for behavioral problems and confusion.         OBJECTIVE      BP (!) 118/58 (Site: Right Upper Arm, Position: Sitting, Cuff Size: Medium Adult)   Pulse 74   Temp 97.3 °F (36.3 °C) (Temporal)   Resp 18   Ht 5' 1\" (1.549 m)   Wt 118 lb 11.2 oz (53.8 kg)   SpO2 93%   BMI 22.43 kg/m²       Physical Exam  Vitals reviewed.    Constitutional:       Appearance: Normal appearance. She is well-developed. She is not ill-appearing. HENT:      Head: Normocephalic and atraumatic. Nose: No congestion. Eyes:      General: No scleral icterus. Extraocular Movements: Extraocular movements intact. Pupils: Pupils are equal, round, and reactive to light. Neck:      Vascular: No JVD. Trachea: No tracheal deviation. Cardiovascular:      Rate and Rhythm: Normal rate and regular rhythm. Heart sounds: Normal heart sounds. No murmur heard. Pulmonary:      Effort: Pulmonary effort is normal. No respiratory distress. Breath sounds: Normal breath sounds. No wheezing. Chest:      Chest wall: No tenderness. Breasts:         Right: No swelling, bleeding, inverted nipple, mass, nipple discharge, skin change or tenderness. Left: No swelling, bleeding, inverted nipple, mass, nipple discharge, skin change or tenderness. Abdominal:      General: There is no distension. Palpations: There is no mass. Tenderness: There is no abdominal tenderness. Musculoskeletal:         General: No deformity. Cervical back: Neck supple. No rigidity or tenderness. Right lower leg: No edema. Left lower leg: No edema. Lymphadenopathy:      Cervical: No cervical adenopathy. Right cervical: No superficial, deep or posterior cervical adenopathy. Left cervical: No superficial, deep or posterior cervical adenopathy. Upper Body:      Right upper body: No supraclavicular, axillary or pectoral adenopathy. Left upper body: No supraclavicular, axillary or pectoral adenopathy. Skin:     General: Skin is warm and dry. Coloration: Skin is not jaundiced or pale. Findings: No rash. Neurological:      General: No focal deficit present. Mental Status: She is alert and oriented to person, place, and time. Cranial Nerves: No cranial nerve deficit.    Psychiatric:         Behavior: Behavior normal.         Thought Content: Thought content normal.                  Lab Results   Component Value Date     WBC 5.1 2021     HGB 14.8 2021     HCT 46.2 2021      2021     CHOL 162 02/10/2021     TRIG 47 02/10/2021     HDL 94 02/10/2021     ALT 14 02/10/2021     AST 18 02/10/2021      02/10/2021     K 3.9 02/10/2021      02/10/2021     CREATININE 0.7 02/10/2021     BUN 16 02/10/2021     CO2 27 02/10/2021     TSH 2.490 02/10/2021         Performed by: [id number] Pathology     [facility]                   [id number]   Assoc[facility]                                              Page 1 of 4 [address], [place] [address]                                                       PROC: 2021   [facility]                                    RECV: 2021   [address]                                    RPTD: 2021   [address] CABRERA [address], [place] [address]                       MRN:  [id number]    LOC: [facility]                       ACCT: [de-identified]  SEX: F                       : 1965  AGE: 55 Y                          PATHOLOGY REPORT                       ATTN: NNEKA QUIROZ                       REQ: Claudia Valentino       Copies To:   Peter Thomason; Clarke Door       Clinical Information: CALCIFICATIONS     FINAL DIAGNOSIS:   A.  Right breast, upper outer quadrant calcifications \"A\", coil clip,   biopsy:       High-grade ductal carcinoma in situ, solid type with focal necrosis       and associated microcalcifications.    Microcalcifications present in nonneoplastic tissue.      BREAST BIOMARKERS*   Estrogen Receptor: (Clone SP1), [employer]       Negative (<1% of cells staining)     Progesterone Receptor: (Clone 1E2), [employer]       Negative (<1% of cells staining)     External Controls:  Adequate   Internal Controls:  Adequate   Standard Assay Conditions:  Met     Staining Method Used:    Formalin fixation    Antigen retrieval type:  Cell Conditioning 1, mild mian    Time in antigen retrieval:  30 minutes    Detection system type:   DAB Ultraview kit     B.  Right breast, upper outer quadrant calcifications \"B\", coil clip,   biopsy:    Benign breast tissue with glandular microcalcifications. Specimen:   A) CORE NEEDLE BREAST BIOPSY, RIGHT CALC, UOQ, COIL CLIP   B) CORE NEEDLE BREAST BIOPSY, RIGHT CALC, UOQ, COIL CLIP       Gross Examination:   A - The container is labeled Randell Hartus, right breast,   calcifications, upper outer quadrant, coil clip, A.  Received in   formalin are multiple bits of yellow-white fibrofatty tissue   aggregating to about 2 x 1.8 x 0.2 cm.  1 ns. Fixative:  10% neutral buffered formalin   Tissue removal time:  08:46   Radiology time:  08:48   Tissue fixation time:  08:50   Total fixation time: 11 hours 10 minutes     B - The container is labeled Randell Hartus, right breast,   calcifications, coil clip, upper outer quadrant, B.  Received in   formalin are two fragments of yellow-white fibrofatty tissue, each   about 1 cm in greatest dimension.  1 ns.  PCF/DKR:[id number]     Fixative:  10% neutral buffered formalin   Tissue removal time:  08:46   Radiology time:  08:48   Tissue fixation time:  08:50   Total fixation time: 11 hours 10 minutes     Microscopic Examination:   A.  Sections show few ducts expanded by tumor cells containing enlarged   vesicular nuclei with eosinophilic nucleoli.  Occasional ducts are   distorted and irregular with periductal stromal fibrosis and a   lymphocytic infiltrate.  That said, histologic evidence of   microinvasive carcinoma is absent.  Immunohistochemistry for p63 and   CK5/6 highlight retained myoepithelial cells.  Controls are adequate.      Procedure: Needle biopsy   Specimen laterality: Right   Tumor site: Upper outer quadrant   Histologic type: Ductal carcinoma in situ   Architectural patterns: Solid   Nuclear grade: Grade III (high)   Necrosis: Present, focal   Microcalcifications: Present in suspicious enhancement anywhere in the breast. There is no suspicious morphology. No lesions are present.       Left breast: There are no areas of suspicious enhancement. There is no suspicious morphology. No lesions are present.       There are no abnormalities in the axilla, mediastinum or visualized bones. There do appear to be a few small simple cysts in the liver.                   Impression   No MR evidence of malignancy on either side. The biopsy-proven site of DCIS in the right upper outer quadrant does not have an MR correlate.           Surgical management is recommended.               BI-RADS CATEGORY 6: Known biopsy proven malignancy.       Management: Surgical excision when clinically appropriate           **This report has been created using voice recognition software. It may contain minor errors which are inherent in voice recognition technology. **       Final report electronically signed by Dr. Avni Mcfarland on 7/16/2021 4:09 PM      MALIHA HAI DIGITAL SCREEN SELF REFERRAL W OR WO CAD BILATERAL (Order [id number])  Narrative   LOCATION: [facility]       PROCEDURE: MALIHA HAI DIGITAL SCREEN SELF REFERRAL W OR WO CAD BILATERAL       CLINICAL INFORMATION: Visit for screening mammogram. Tomosynthesis.       CLINICAL:     Self-referred screening mammogram   PATIENT MEDICAL HISTORY:  No relevant medical history has been documented for this patient. FAMILY HISTORY:   Family medical history includes breast cancer in sister. RISK VALUES: Tyrer-Cuzick 10yr.: 6.3%, Tyrer-Cuzick life:   19.7%       COMPARISON: 6/26/2020, 12/13/2019, 5/10/2019 and 5/7/2018.       TECHNIQUE: Bilateral CC and MLO views of the breasts were obtained.  3D tomosynthesis was utilized.   CAD was utilized.       BREAST COMPOSITION: The tissue of the breast(s) is extremely dense, which lowers the sensitivity of mammography.       FINDINGS: Yordy Slater is a possible cluster of calcifications in the upper outer right breast, posterior depth.        No indeterminate. These are not readily seen on the cc view.       No other significant masses, calcifications, or other findings are seen in the breast(s).             Impression       1. Cluster of pleomorphic calcifications in the upper outer right breast posterior depth. A stereotactic guided biopsy is recommended.       2. 2nd cluster of possible calcifications are seen on the LM and MLO views only just inferior to the 1st cluster. Stereotactic guided biopsy of these calcifications is also recommended.       These results were discussed with the patient. The tech navigator was notified. We will arrange scheduling the patient for her procedure per department protocol.                   BI-RADS CATEGORY 4B - Intermediate suspicion for malignancy.       Management: Tissue diagnosis.  Biopsy should be performed in the absence of clinical contraindication.               **This report has been created using voice recognition software. It may contain minor errors which are inherent in voice recognition technology. **       Final report electronically signed by Dr. Jaime Fregoso on 7/2/2021 11:52 AM          MALIHA HAI DIGITAL SCREEN SELF REFERRAL W OR WO CAD BILATERAL (Order [id number])  Narrative   LOCATION: [facility]       PROCEDURE: MALIHA HAI DIGITAL SCREEN SELF REFERRAL W OR WO CAD BILATERAL       CLINICAL INFORMATION: Visit for screening mammogram. Tomosynthesis.       CLINICAL:     Self-referred screening mammogram   PATIENT MEDICAL HISTORY:  No relevant medical history has been documented for this patient. FAMILY HISTORY:   Family medical history includes breast cancer in sister. RISK VALUES: Tyrer-Cuzick 10yr.: 6.3%, Tyrer-Cuzick life:   19.7%       COMPARISON: 6/26/2020, 12/13/2019, 5/10/2019 and 5/7/2018.       TECHNIQUE: Bilateral CC and MLO views of the breasts were obtained.  3D tomosynthesis was utilized.   CAD was utilized.       BREAST COMPOSITION: The tissue of the breast(s) is extremely dense, which lowers the sensitivity of mammography.       FINDINGS: Eliseo Mclean is a possible cluster of calcifications in the upper outer right breast, posterior depth.        No other significant masses, calcifications, or other findings are seen in the breast(s).             Impression   Possible cluster of calcifications in the upper outer right breast. Magnification views and a right LM view are recommended.       The patient will be contacted by our department per protocol regarding additional imaging and scheduling.       .           BI-RADS CATEGORY 0: NEED ADDITIONAL EVALUATION AND/OR PRIOR MAMMOGRAMS FOR COMPARISON.       Management Recommendation: Recall for additional imaging.       The patient will be contacted by our department for additional imaging/scheduling.               **This report has been created using voice recognition software. It may contain minor errors which are inherent in voice recognition technology. **       Final report electronically signed by Dr. Toni Tovar on 6/30/2021 4:53 PM

## 2021-08-17 NOTE — ANESTHESIA PRE PROCEDURE
Department of Anesthesiology  Preprocedure Note       Name:  Jayson Vasquez   Age:  55 y.o.  :  1965                                          MRN:  [id number]         Date:  2021      Surgeon: Betzy Chávez):  Samir Brothers MD    Procedure: Procedure(s):  RE-EXCISION RIGHT BREAST MARGINS    Medications prior to admission:   Prior to Admission medications    Medication Sig Start Date End Date Taking?  Authorizing Provider   Omega-3 Fatty Acids (SM FISH OIL PO) Take by mouth daily    Historical Provider, MD   MAGNESIUM PO Take by mouth daily    Historical Provider, MD       Current medications:    Current Facility-Administered Medications   Medication Dose Route Frequency Provider Last Rate Last Admin    0.9 % sodium chloride infusion   Intravenous Continuous Samir Brothers MD        sodium chloride flush 0.9 % injection 5-40 mL  5-40 mL Intravenous 2 times per day Samir Brothers MD        sodium chloride flush 0.9 % injection 5-40 mL  5-40 mL Intravenous PRN Samir Brothers MD        0.9 % sodium chloride infusion  25 mL Intravenous PRN Samir Brothers MD        ceFAZolin (ANCEF) 2000 mg in dextrose 5 % 50 mL IVPB  2,000 mg Intravenous On Call to [address] [place] [address] [place], [place]           Allergies:  No Known Allergies    Problem List:    Patient Active Problem List   Diagnosis Code    Ductal carcinoma in situ (DCIS) of right breast D05.11       Past Medical History:        Diagnosis Date    Breast [facility] 2021    right    COVID-19 2020       Past Surgical History:        Procedure Laterality Date    BREAST LUMPECTOMY Right 2021    RIGHT BREAST LUMPECTOMY, PREOP MAG SEED PLACEMENT performed by Samir Brothers MD at [address] [place] [address]      Dr Julian Berumen  2017    Dr. Galloway Courser MALIHA STEROTACTIC LOC BREAST BIOPSY RIGHT Right 2021    MALIHA STEROTACTIC LOC BREAST BIOPSY RIGHT 2021 Avni Mcfarland MD [facility]    US GUIDED NEEDLE LOC OF RIGHT BREAST Right 8/9/2021    US GUIDED NEEDLE LOC OF RIGHT BREAST 8/9/2021 Matt Gray MD [facility]       Social History:    Social History     Tobacco Use    Smoking status: Never Smoker    Smokeless tobacco: Never Used   Substance Use Topics    Alcohol use: Yes     Comment: occasionally                                Counseling given: Not Answered      Vital Signs (Current):   Vitals:    08/17/21 0759   BP: (!) 92/57   Pulse: 66   Resp: 15   Temp: 98 °F (36.7 °C)   TempSrc: Temporal   SpO2: 99%   Weight: 121 lb (54.9 kg)   Height: 5' 1\" (1.549 m)                                              BP Readings from Last 3 Encounters:   08/17/21 (!) 92/57   08/11/21 102/66   08/11/21 (!) 91/56       NPO Status: Time of last liquid consumption: 1800                        Time of last solid consumption: 1800                        Date of last liquid consumption: 08/16/21                        Date of last solid food consumption: 08/16/21    BMI:   Wt Readings from Last 3 Encounters:   08/17/21 121 lb (54.9 kg)   08/11/21 119 lb (54 kg)   07/19/21 118 lb 11.2 oz (53.8 kg)     Body mass index is 22.86 kg/m². CBC:   Lab Results   Component Value Date    WBC 5.1 07/22/2021    RBC 4.62 07/22/2021    HGB 14.8 07/22/2021    HCT 46.2 07/22/2021    .0 07/22/2021     07/22/2021       CMP:   Lab Results   Component Value Date     02/10/2021    K 3.9 02/10/2021     02/10/2021    CO2 27 02/10/2021    BUN 16 02/10/2021    CREATININE 0.7 02/10/2021    LABGLOM 87 02/10/2021    GLUCOSE 87 02/10/2021    PROT 7.0 02/10/2021    CALCIUM 9.8 02/10/2021    BILITOT 1.3 02/10/2021    ALKPHOS 49 02/10/2021    AST 18 02/10/2021    ALT 14 02/10/2021       POC Tests: No results for input(s): POCGLU, POCNA, POCK, POCCL, POCBUN, POCHEMO, POCHCT in the last 72 hours.     Coags: No results found for: PROTIME, INR, APTT    HCG (If Applicable): No results found for: PREGTESTUR, PREGSERUM, HCG, HCGQUANT     ABGs: No results found for: PHART, PO2ART, CSR5XOQ, UZY7ISS, BEART, I1NHNOXH     Type & Screen (If Applicable):  No results found for: LABABO, LABRH    Drug/Infectious Status (If Applicable):  No results found for: HIV, HEPCAB    COVID-19 Screening (If Applicable): No results found for: COVID19        Anesthesia Evaluation  Patient summary reviewed  Airway: Mallampati: II  TM distance: >3 FB   Neck ROM: full  Mouth opening: > = 3 FB Dental:          Pulmonary:                              Cardiovascular:                      Neuro/Psych:               GI/Hepatic/Renal:             Endo/Other:                     Abdominal:             Vascular: Other Findings:             Anesthesia Plan      MAC     ASA 2       Induction: intravenous. Anesthetic plan and risks discussed with patient. Plan discussed with CRNA. Colette Webster.  [address] [facility]   8/17/2021

## 2021-08-17 NOTE — PROGRESS NOTES
1011: Patient to phase 2 recovery room via cart. Patient is awake and alert. Report received from surgical RN, Brigitte Reese. Patient's vitals obtained, see charting. 1013: Patient is denying pain and nausea at this time. IV is infusing 0.9 into her arm. Dressing to her right breast is clean, dry and intact- see LDA. Bruising noted around incision site which was there pre op. 1015: Offered drink and snack provided to the patient. Bed is in the lowest position, call light is within reach and patient's  is at the bedside. 1040: IV removed at this time- no complications and dressing applied. Patient stating she is having slight pain at incision site but is denying wanting anything for it at this time. 1049: Discharge instructions given and explained to the patient and the patient's wife, both verbalized understanding. 1055: Patient ambulated to the car and discharged home in stable condition with her .

## 2021-08-17 NOTE — BRIEF OP NOTE
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

## 2021-08-17 NOTE — ANESTHESIA POSTPROCEDURE EVALUATION
Department of Anesthesiology  Postprocedure Note    Patient: Rudy Cowan  MRN: [id number]  YOB: 1965  Date of evaluation: 8/17/2021  Time:  10:19 AM     Procedure Summary     Date: 08/17/21 Room / Location: [facility] 04 / [facility]    Anesthesia Start: 6461 Anesthesia Stop: 5994    Procedure: RE-EXCISION RIGHT BREAST MARGINS (Right ) Diagnosis: (DCIS RIGHT BREAST)    Surgeons: Denia Resendez MD Responsible Provider: Roz Echevarria DO    Anesthesia Type: MAC ASA Status: 2          Anesthesia Type: MAC    Annamaria Phase I: Annamaria Score: 10    Annamaria Phase II:      Last vitals: Reviewed and per EMR flowsheets. Anesthesia Post Evaluation    Comments: Kim Moreno 61  POST-ANESTHESIA NOTE       Name:  Rudy Cowan                                         Age:  55 y.o. MRN:  [id number]      Last Vitals:  BP (!) 92/57   Pulse 66   Temp 98 °F (36.7 °C) (Temporal)   Resp 15   Ht 5' 1\" (1.549 m)   Wt 121 lb (54.9 kg)   SpO2 99%   BMI 22.86 kg/m²   Patient Vitals in the past 4 hrs:  08/17/21 0759, BP:(!) 92/57, Temp:98 °F (36.7 °C), Temp src:Temporal, Pulse:66, Resp:15, SpO2:99 %, Height:5' 1\" (1.549 m), Weight:121 lb (54.9 kg)    Level of Consciousness:  Awake    Respiratory:  Stable    Oxygen Saturation:  Stable    Cardiovascular:  Stable    Hydration:  Adequate    PONV:  Stable    Post-op Pain:  Adequate analgesia    Post-op Assessment:  No apparent anesthetic complications    Additional Follow-Up / Treatment / Comment:  None    Los Plunkett DO  August 17, 2021   10:19 AM

## 2021-08-17 NOTE — OP NOTE
[address] [id number]                                OPERATIVE REPORT    PATIENT NAME: Mariella Narayan                     :        1965  MED REC NO:   [id number]                           ROOM:  ACCOUNT NO:   [de-identified]                           ADMIT DATE: 2021  PROVIDER:     Emma Vargas M.D.    DATE OF PROCEDURE:  2021    SURGEON:  Emma Vargas MD    PREOPERATIVE DIAGNOSIS:  DCIS, right breast, positive medial margin. POSTOPERATIVE DIAGNOSIS:  DCIS, right breast, positive medial margin. PROCEDURE:  Re-excision, right breast margin. ANESTHESIA:  IV local sedation, monitored anesthesia care. ESTIMATED BLOOD LOSS:  Less than 10 mL. IMMEDIATE SPECIMEN:  Medial margin to Pathology for permanent  sectioning. INDICATION:  See history and physical examination. The patient with  previous lumpectomy for high-grade DCIS, right breast; 6 mm DCIS focally  positive medial margin. DESCRIPTION OF THE PROCEDURE:  The patient was brought the operating  room, placed supine on the operating table. The patient had bruising of  the right breast from previous lumpectomy. Right breast was prepped and  draped. She was given antibiotic intravenously. Old incision was  opened after infiltrating with local anesthesia. Incision was opened. Old hematoma was evacuated. Medial margin was excised sharply with  scissors. The new medial margin was oriented with suture. Hematoma was  evacuated. Wound was irrigated. Hemostasis was achieved. Previous  deep margin was carried down to the pectoralis muscles. There were some  few muscle fibers which were taken with the medial margin at the deep  margin of the specimen. Once hemostasis was achieved, dermis was closed  with interrupted 3-0 Vicryl suture, and skin was closed with running  subcuticular 4-0 Monocryl suture. Skin glue was applied.   The

## 2021-08-18 ENCOUNTER — TELEPHONE (OUTPATIENT)
Dept: SURGERY | Age: 56
End: 2021-08-18

## 2021-08-18 NOTE — TELEPHONE ENCOUNTER
Pt states she is feeling well this morning after surgery. States she is a little sore, but is controlling pain with her medication as prescribed. Pt states she is urinating with no issues, and will take stool softeners/laxatives if needed for BM. Pt states that incisions are clean, dry and intact- denies any s/sx of infection. Advised to call the office with any questions or concerns.

## 2021-08-18 NOTE — TELEPHONE ENCOUNTER
----- Message from Farzaneh Lambert MD sent at 8/18/2021 11:50 AM EDT -----  May notify pt new margin negative  ----- Message -----  From: Mary Kate Bob Incoming Lab Results From Soft  Sent: 8/18/2021  11:20 AM EDT  To: Farzaneh Lambert MD

## 2021-08-23 ENCOUNTER — OFFICE VISIT (OUTPATIENT)
Dept: SURGERY | Age: 56
End: 2021-08-23

## 2021-08-23 VITALS
SYSTOLIC BLOOD PRESSURE: 120 MMHG | OXYGEN SATURATION: 92 % | RESPIRATION RATE: 18 BRPM | HEART RATE: 67 BPM | BODY MASS INDEX: 22.88 KG/M2 | DIASTOLIC BLOOD PRESSURE: 80 MMHG | WEIGHT: 121.2 LBS | TEMPERATURE: 97.2 F | HEIGHT: 61 IN

## 2021-08-23 DIAGNOSIS — C50.911 DUCTAL CARCINOMA OF RIGHT BREAST (HCC): Primary | ICD-10-CM

## 2021-08-23 PROCEDURE — 99024 POSTOP FOLLOW-UP VISIT: CPT | Performed by: SURGERY

## 2021-08-23 NOTE — LETTER
[address] [facility]  [place] [address] [address] [facility] [address]  Phone: [phone]  Fax: [phone]    Nga Mancera MD        August 24, 2021    Maria R Briseno - CNP  [address] [address]    Patient: Shawnee Rosales   MR Number: [id number]   YOB: 1965   Date of Visit: 8/23/2021     Dear Jasmyn Chowdhury ,    I recently saw your patient, Shawnee Rosales for a follow-up visit. Below are the relevant portions of my assessment and plan of care. 1. Ductal carcinoma of right breast (HCC)    Receptor negative    1. Pathology reviewed with the patient who understands. All questions were answered. Reexcision margin negative  2. 2.follow up: Return in about 3 months (around 11/23/2021). 3.  Follow-up medical oncology on Wednesday  4. Referral to radiation oncology. If you have questions, please do not hesitate to call me. I look forward to following Jordy Sylvester along with you.     Sincerely,          Nga Mancera MD

## 2021-08-23 NOTE — PROGRESS NOTES
Casie Street MD  General Surgery  Postprocedure Evaluation in Office  Pt Name: Jayson Vasquez  Date of Birth 1965   Today's Date: 8/23/2021  Medical Record Number: [id number]  Primary Care Provider: Filomena Saab , APRN - [address]  Chief Complaint   Patient presents with    Post-Op Check     s/p Re-excision, right breast margin 8/17/21    Post-Op Check     s/p Right partial mastectomy with magnetic seed localization 8/11/21     ASSESSMENT      1. Ductal carcinoma of right breast (HCC)       Stage 0 ER negative, KS negative  PLANS       1. Pathology reviewed with the patient who understands. All questions were answered. Reexcision margin negative 2.follow up: Return in about 3 months (around 11/23/2021). 3.  Follow-up medical oncology on Wednesday    4. Referral to radiation oncology  5. Range of motion exercises. Activity as tolerated. Jose Camilo is seen today for post-op follow-up. She is status post reexcision medial margin right breast and right breast lumpectomy for high-grade receptor negative DCIS. She had hematoma after initial procedure was evacuated. Bruising of breast still evident but hematoma resolved. New medial margin negative. DCIS was 6 mm. She has appointment with Dr. Lou Rodriguez Wednesday. Will refer for radiation oncology consultation. She will require a course of postoperative radiation therapy. Medications    Current Outpatient Medications:     MAGNESIUM PO, Take by mouth daily, Disp: , Rfl:   Allergies    No Known Allergies    Review of Systems  History obtained from the patient. Constitutional: Denies any fever, chills, fatigue. Wound: Denies any wound drainage  Resp: Denies any cough, shortness of breath. CV: Denies any chest pain, orthopnea or syncope.        OBJECTIVE     VITALS: /80 (Site: Left Upper Arm, Position: Sitting, Cuff Size: Medium Adult)   Pulse 67   Temp 97.2 °F (36.2 °C) (Temporal)   Resp 18   Ht 5' 1\" (1.549 m)   Wt 121 lb 3.2 oz (55 kg)   SpO2 92%   BMI 22.90 kg/m²     CONSTITUTIONAL: Alert and oriented times 3, no acute distress and cooperative to examination. SKIN: Skin color, texture, turgor normal. No rashes or lesions. INCISION: wound margins intact and healing well. Bruising present  NECK: Soft, trachea midline and straight  BREAST: Incision intact. No wound drainage. Old bruising. LUNGS: Chest expands equally bilaterally upon respiration, no accessory muscle used. Ausculation reveals no wheezes, rales or rhonchi. CARDIOVASCULAR: Heart sounds are normal.  Normal heart rate. NEUROLOGIC: No sensory or motor nerve irritation  EXTREMITIES: no cyanosis, no clubbing and no edema. Performed by: [address] [place], [place] [address]primo. Pathology     [facility]                   [id number]   Assoc.                                              Page 1 of 5 [address] [facility], [address] [address]                                                       PROC: 2021   [facility]                                    RECV: 2021   [address]                                    RPTD: 2021   [facility], [address] [address]                       MRN:  [id number]    LOC: [facility]                       ACCT: [id number]  SEX: F                       : 1965  AGE: 55 Y                          PATHOLOGY REPORT                       ATTN: KARSON ROBISON   [de-identified]                  REQ: Sandeep ROBISON       Copies To:   Jose Carvalho       Clinical Information: DCIS RIGHT BREAST     FINAL DIAGNOSIS:   Right breast, new medial margin, reexcision:    Changes consistent with prior procedure.    Glandular microcalcifications.    Negative for malignancy.      Specimen:   EXCISION OF BREAST, RIGHT       Gross Examination:   The container is labeled Oneda Daft, right breast tissue, suture new   medial margin.  Received in formalin is a fragment of yellow, focally   hemorrhagic adipose tissue measuring 3.5 x 2 x about 1.5 cm.  There is   a suture designating the new medial margin.  The medial margin is inked   blue.  Sections through the specimen reveal hemorrhage.  There are no   discrete masses.  Representative sections are submitted in four   cassettes.  ss.  PCF/DKR:[id number]     Microscopic Examination:   Sections show changes consistent prior procedure. Santi Stuart is no evidence   of residual ductal carcinoma in situ. [address]                                                     <Sign Out Dr. Juany Huizar M.D., F.C. A. P       [facility]/ [address] [facility] [address]  Printed on:  2021   Isaiah Gao, David Reeder   Original print date: 2021   Lab and Collection      Performed by: [address] Pathology     Patti Marlow, [facility]                   [id number]   Assoc.                                              Page 1 of 2 [phone], [place] [address]                                                       PROC: 2021   [facility]                                    RECV: 2021   [address]                                    RPTD: 2021   [address], [place] [address]                       MRN:  [id number]    LOC: [facility]                       ACCT: [de-identified]  SEX: F                       : 1965  AGE: 55 Y                          PATHOLOGY REPORT                       ATTN: KARSON ROBISON   [de-identified]                  REQ: Cameron ROBISON       Copies To:   [facility]       Clinical Information: DCIS RIGHT BREAST     FINAL DIAGNOSIS:   Right breast, lumpectomy:    Ductal carcinoma in situ, high nuclear grade, cribriform, solid, and   comedo types.     Carcinoma in situ is >=6 mm in greatest dimension and present       within 7 of 14 blocks.       Negative for invasive carcinoma.       DCIS is present at the medial margin (unifocal, less than 1 mm).     DCIS is 2 mm to the anterior margin.       Changes consistent with previous biopsy site.       Pseudoangiomatous stromal hyperplasia.        pTis     Specimen:   EXCISION OF BREAST, RIGHT MASS       Gross Examination:   The container is labeled Fletcher Montana, DCIS, right breast.  Received   fresh on an x-ray grid is an oriented lumpectomy specimen. Monica Botello is no   localization wire. Monica Botello is a Magseed and calcification present at   grid coordinates C-D/2-3. Maria Elena Sheridan specimen measures 5 cm from the medial   to lateral margin, 3.5 cm from the cranial to caudal margin, and 2 cm   from the skin to deep margin.  The skin margin is inked blue, the deep   margin is inked yellow, the caudal and lateral margins are inked black,   and the cranial and medial margins are inked green.  Sections through   the specimen reveal yellow-white fibrofatty cut surface.  Much of the   specimen consists of denser white fibrous tissue.  The Magseed is   identified. Monica Botello is a biopsy site measuring about 0.7 x 0.5 x about 1   cm.  No discrete masses are identified.  Representative sections are   submitted.  Cassette #1 - lateral margin; cassettes #2 through #13 -   all include the skin and deep margin, cassette #2 - caudal; cassette #3   - cranial; cassette #4 - caudal; cassette #5 - cranial; cassette #6 -   caudal; cassette #7 - cranial; cassette #8 - caudal; cassette #9 -   cranial; cassette #10 - caudal; cassette #11 - cranial; cassette #12 -   caudal; cassette #13 - cranial; and cassette #14 - medial margin.  ss.    DEBBIE/DKR:[id number]     Fixative:  10% neutral buffered formalin   Tissue removal time:  09:58   Tissue fixation time:  10:25   Total fixation time:     Microscopic Examination:   Reporting Template   Protocol Posting Date: June 2021     CASE SUMMARY: (DCIS OF THE BREAST: Resection)   Standard(s): AJCC-UICC 8     SPECIMEN   Procedure   Excision (less than total mastectomy)     Specimen Laterality   Right     TUMOR   Tumor Site   Upper outer quadrant     Histologic Type   Ductal carcinoma in situ     Size (Extent) of DCIS   The size (extent) of DCIS (greatest dimension using gross and   microscopic evaluation) is an estimation of the volume of breast tissue   occupied by DCIS. Estimated size (extent) of DCIS is at least in Millimeters (mm): at   least 6 mm    Number of Blocks with DCIS: 7    Number of Blocks Examined: 14     Architectural Patterns   Comedo   Cribriform   Solid     Nuclear Grade   Grade III (high)     Necrosis   Present, central (expansive \"comedo\" necrosis)     MARGINS   Margin Status   DCIS present at margin   Margin status is listed as \"positive\" if there is ink on DCIS (ie, the   distance is 0 mm). Extent of margin involvement may be specified as   unifocal, multifocal, or extensive.    Margin Involved by DCIS    Medial (block A14): unifocal (<1 mm)      Distance from DCIS to Anterior Margin    Specify in Millimeters (mm)    Exact distance: 2 mm     REGIONAL LYMPH NODES   Regional Lymph Node Status   Not applicable (no regional lymph nodes submitted or found)     PATHOLOGIC STAGE CLASSIFICATION (pTNM, AJCC 8th Edition)   Reporting of pT, pN, and (when applicable) pM categories is based on   information available to the pathologist at the time the report is   issued. As per the AJCC (Chapter 1, 8th Ed.) it is the managing   physician's responsibility to establish the final pathologic stage   based upon all pertinent information, including but potentially not   limited to this pathology report.      TNM Descriptors   Not applicable     pT Category   pTis (DCIS): Ductal carcinoma in situ     Regional Lymph Nodes Modifier   Not applicable     pN Category   pN not assigned (no nodes submitted or found)     ADDITIONAL FINDINGS   Additional Findings (specify): changes consistent with previous biopsy   site, simple cyst, pseudoangiomatous hyperplasia (PASH)     SPECIAL STUDIES   Breast Biomarker Testing Performed on Previous Biopsy   Estrogen Receptor (ER)    Estrogen Receptor (ER) Status    Negative   Progesterone Receptor (PgR)    Progesterone Receptor (PgR) Status  Negative    Testing Performed on Case Number: [id number] from procedure date   07/12/2021     Note: An immunohistochemical stain* for p63 was performed on blocks A2,   A3, A4, A10, and A14 to evaluate for a myoepithelial cell layer and   exclude invasive carcinoma.  The controls are adequate.  The neoplastic   ducts are positive for p63 at the surrounding edge.  This finding is   consistent with ductal carcinoma in situ.  Invasive malignancy is not   identified. *This test was developed and its performance characteristics determined   by [address] [place] [address] has not been cleared or   approved by the U.S. Food and Drug Administration. Pursuant to the   requirements of CLIA, this laboratory has established and verified the   test's accuracy and precision.  Additional information about this type   of test is available upon request.     [id number]   [id number]                                                     <Sign Out Dr. Osbaldo Rowley M.D., F.C.A.P.        [facility]/ [address] [address]  Printed on:  8/16/2021   Dionna Dubon [address]   [address] [facility] [place]   Original print date: 08/16/2021   Lab and Collection

## 2021-08-25 ENCOUNTER — HOSPITAL ENCOUNTER (OUTPATIENT)
Dept: INFUSION THERAPY | Age: 56
Discharge: HOME | End: 2021-08-25
Payer: COMMERCIAL

## 2021-08-25 ENCOUNTER — OFFICE VISIT (OUTPATIENT)
Dept: ONCOLOGY | Age: 56
End: 2021-08-25
Payer: COMMERCIAL

## 2021-08-25 VITALS
DIASTOLIC BLOOD PRESSURE: 58 MMHG | TEMPERATURE: 98.5 F | BODY MASS INDEX: 22.77 KG/M2 | RESPIRATION RATE: 16 BRPM | HEART RATE: 65 BPM | SYSTOLIC BLOOD PRESSURE: 101 MMHG | WEIGHT: 120.6 LBS | OXYGEN SATURATION: 99 % | HEIGHT: 61 IN

## 2021-08-25 DIAGNOSIS — Z98.890 STATUS POST RIGHT BREAST LUMPECTOMY: ICD-10-CM

## 2021-08-25 DIAGNOSIS — D05.11 DUCTAL CARCINOMA IN SITU (DCIS) OF RIGHT BREAST: Primary | ICD-10-CM

## 2021-08-25 DIAGNOSIS — Z17.1 ESTROGEN RECEPTOR NEGATIVE STATUS (ER-): ICD-10-CM

## 2021-08-25 PROCEDURE — 99211 OFF/OP EST MAY X REQ PHY/QHP: CPT

## 2021-08-25 PROCEDURE — 99204 OFFICE O/P NEW MOD 45 MIN: CPT | Performed by: INTERNAL MEDICINE

## 2021-08-25 NOTE — PROGRESS NOTES
Grandfather     Uterine Cancer Paternal Grandmother         >50    No Known Problems Paternal Grandfather     Ovarian Cancer Paternal Cousin 25      Social History     Tobacco Use    Smoking status: Never Smoker    Smokeless tobacco: Never Used   Substance Use Topics    Alcohol use: Yes     Comment: occasionally      Current Outpatient Medications   Medication Sig Dispense Refill    MAGNESIUM PO Take by mouth daily       No current facility-administered medications for this visit. No Known Allergies   Health Maintenance   Topic Date Due    Hepatitis C screen  Never done    Pneumococcal 0-64 years Vaccine (1 of 4 - PCV13) Never done    COVID-19 Vaccine (1) Never done    HIV screen  Never done    DTaP/Tdap/Td vaccine (1 - Tdap) Never done    Cervical cancer screen  Never done    Colon cancer screen colonoscopy  Never done    Shingles Vaccine (1 of 2) Never done    Flu vaccine (1) Never done    Breast cancer screen  08/11/2022    Lipid screen  02/10/2026    Hepatitis A vaccine  Aged Out    Hepatitis B vaccine  Aged Out    Hib vaccine  Aged Out    Meningococcal (ACWY) vaccine  Aged Out        Subjective:   Review of Systems   Constitutional: Negative for activity change, appetite change, fatigue and fever. HENT: Negative for congestion, dental problem, facial swelling, hearing loss, mouth sores, nosebleeds, sore throat, tinnitus and trouble swallowing. Eyes: Negative for discharge and visual disturbance. Respiratory: Negative for cough, chest tightness, shortness of breath and wheezing. Cardiovascular: Negative for chest pain, palpitations and leg swelling. Gastrointestinal: Negative for abdominal distention, abdominal pain, blood in stool, constipation, diarrhea, nausea, rectal pain and vomiting. Endocrine: Negative for cold intolerance, polydipsia and polyuria.    Genitourinary: Negative for decreased urine volume, difficulty urinating, dysuria, flank pain, hematuria and Mental Status: She is alert and oriented to person, place, and time. Cranial Nerves: No cranial nerve deficit. Motor: No abnormal muscle tone. Deep Tendon Reflexes: Reflexes are normal and symmetric. Psychiatric:         Behavior: Behavior normal.         Thought Content: Thought content normal.         Judgment: Judgment normal.         BP (!) 101/58 (Site: Left Upper Arm, Position: Sitting, Cuff Size: Medium Adult)   Pulse 65   Temp 98.5 °F (36.9 °C) (Oral)   Resp 16   Ht 5' 1\" (1.549 m)   Wt 120 lb 9.6 oz (54.7 kg)   SpO2 99%   BMI 22.79 kg/m²      ECOG status is 0    Imaging studies and labs:   MALIHA BREAST SPECIMEN    Result Date: 8/11/2021  LOCATION: [place] EXAM:   [facility] BREAST SPECIMEN HISTORY: 55 years, Female, Ductal carcinoma in situ (DCIS) of right breast. . COMPARISON:  8/9/2021, 7/16/2021, 7/12/2021, 7/2/2021, 6/30/2021, 6/26/2020, 12/13/2019, 5/15/2019, 5/10/2019, 5/7/2018 SPECIMEN RADIOGRAPH: The surgical specimen contains the [facility] and coil biopsy clip. The calcifications are also present. These results were telephoned to the operating room. **This report has been created using voice recognition software. It may contain minor errors which are inherent in voice recognition technology. ** Final report electronically signed by Dr. Deirdre Swain on 8/11/2021 11:25 AM    US PLACE BREAST LOC DEVICE 1ST LESION RIGHT WIRELESS LOC PER PROTOCOL    Result Date: 8/9/2021  LOCATION: [place] PROCEDURE: MAMMOGRAM POST BX CLIP PLACEMENT RIGHT, MALIHA NEEDLE BREAST LOCALIZATION RIGHT CLINICAL INFORMATION: Ductal carcinoma in situ (DCIS) of right breast . PATIENT MEDICAL HISTORY: Medical history includes breast cancer. FAMILY HISTORY: Family medical history includes breast cancer in sister and ovarian cancer in paternal cousin. COMPARISON: 7/16/2021, 7/12/2021, 7/2/2021, 6/30/2021, 6/26/2020, 12/13/2019 EXAMS: 1. Magseed localization, right  breast, upper outer quadrant posterior.  2. Ultrasound guidance for [facility] placement, imaging supervision and interpretation. 3. Post Magseed right digital diagnostic mammogram. Performing and interpreting physician: Jonathon Tovar MD WIRE LOCALIZATION: Written, informed consent was obtained, including discussion of the risks, benefits and alternatives. A time out was performed to verify correct patient and procedure. The skin was prepped and draped in the usual manner. Local anesthetic was administered to the site. Ultrasound guidance was utilized. Target: coil biopsy clip. Location: Upper outer quadrant right breast posterior depth. Approach: Lateral. Anesthesia: 2% lidocaine. Localization device: Magseed The patient tolerated the procedure without complication. POSTPROCEDURE MAMMOGRAM: Images of the right breast include a CC view and LM view . Tomosynthesis. CAD was utilized. The tissue of the breast(s) is extremely dense, which lowers the sensitivity of mammography. Post placement mammograms were taken in a separate room and marked. The Magseed is located adjacent to the biopsy clip. 1. Successful right breast Magseed localization. 2. Post mammographic images demonstrates the Magseed in the appropriate position. BI-RADS Final Assessment Category:  Waiting for pathology. Management Recommendation: Assess radiologic/pathologic concordance. **This report has been created using voice recognition software. It may contain minor errors which are inherent in voice recognition technology. ** Final report electronically signed by Dr. Chaparrita Paz on 8/9/2021 2:07 PM    [facility] POST BX CLIP PLACEMENT RIGHT    Result Date: 8/9/2021  LOCATION: [place] PROCEDURE: MAMMOGRAM POST BX CLIP PLACEMENT RIGHT, MALIHA NEEDLE BREAST LOCALIZATION RIGHT CLINICAL INFORMATION: Ductal carcinoma in situ (DCIS) of right breast . PATIENT MEDICAL HISTORY: Medical history includes breast cancer. FAMILY HISTORY: Family medical history includes breast cancer in sister and ovarian cancer in paternal cousin. COMPARISON: 7/16/2021, 7/12/2021, 7/2/2021, 6/30/2021, 6/26/2020, 12/13/2019 EXAMS: 1. Magseed localization, right  breast, upper outer quadrant posterior. 2. Ultrasound guidance for [facility] placement, imaging supervision and interpretation. 3. Post Magseed right digital diagnostic mammogram. Performing and interpreting physician: Peyton Maldonaod MD WIRE LOCALIZATION: Written, informed consent was obtained, including discussion of the risks, benefits and alternatives. A time out was performed to verify correct patient and procedure. The skin was prepped and draped in the usual manner. Local anesthetic was administered to the site. Ultrasound guidance was utilized. Target: coil biopsy clip. Location: Upper outer quadrant right breast posterior depth. Approach: Lateral. Anesthesia: 2% lidocaine. Localization device: Magseed The patient tolerated the procedure without complication. POSTPROCEDURE MAMMOGRAM: Images of the right breast include a CC view and LM view . Tomosynthesis. CAD was utilized. The tissue of the breast(s) is extremely dense, which lowers the sensitivity of mammography. Post placement mammograms were taken in a separate room and marked. The Magseed is located adjacent to the biopsy clip. 1. Successful right breast Magseed localization. 2. Post mammographic images demonstrates the Magseed in the appropriate position. BI-RADS Final Assessment Category:  Waiting for pathology. Management Recommendation: Assess radiologic/pathologic concordance. **This report has been created using voice recognition software. It may contain minor errors which are inherent in voice recognition technology. ** Final report electronically signed by Dr. Franki Buchanan on 8/9/2021 2:07 PM    Lab Results   Component Value Date    WBC 5.1 07/22/2021    HGB 14.8 07/22/2021    HCT 46.2 07/22/2021    .0 (H) 07/22/2021     07/22/2021       Chemistry        Component Value Date/Time     02/10/2021 0740    K 3.9 02/10/2021 0740     02/10/2021 0740    CO2 27 02/10/2021 0740    BUN 16 02/10/2021 0740    CREATININE 0.7 02/10/2021 0740        Component Value Date/Time    CALCIUM 9.8 02/10/2021 0740    ALKPHOS 49 02/10/2021 0740    AST 18 02/10/2021 0740    ALT 14 02/10/2021 0740    BILITOT 1.3 (H) 02/10/2021 0740            Assessment/Plan:   1. Right breast DCIS. ER, [place] negative. The patient is s/p right lumpectomy. Following local treatment, the decision to administer endocrine therapy to reduce the risk of subsequent cancers depends upon the tumor hormone receptor status and the choice of local therapy. The primary role of systemic treatment is to reduce the risk of invasive breast cancer in the ipsilateral or contralateral breast. Chemotherapy plays no role in the management of these patients given the low risk of distant metastatic disease and overall good prognosis. Prophylactic hormonal treatment is only indicated for the patients was ER/[place] positive for DCIS. Referral is made to radiation oncology. I would like to see the patient again in 6 months for breast cancer surveillance. The risk of local disease recurrence contralateral DCIS or invasive breast cancer is approximately 1.4 %/year       Diagnosis Orders   1. Ductal carcinoma in situ (DCIS) of right breast  Ambulatory referral to Radiation Oncology   2. Status post right breast lumpectomy     3. Estrogen receptor negative status (ER-)          Plan:   No follow-ups on file. Orders Placed:   Orders Placed This Encounter   Procedures    Ambulatory referral to Radiation Oncology     Referral Priority:   Routine     Referral Type:   Consult for Advice and Opinion     Referral Reason:   Specialty Services Required     Requested Specialty:   Radiation Oncology     Number of Visits Requested:   1        Medications Prescribed:   No orders of the defined types were placed in this encounter.            Discussed use, benefit, and side effectsof prescribed

## 2021-08-26 ENCOUNTER — HOSPITAL ENCOUNTER (OUTPATIENT)
Dept: PHYSICAL THERAPY | Age: 56
Setting detail: THERAPIES SERIES
End: 2021-08-26
Payer: COMMERCIAL

## 2021-08-31 ENCOUNTER — HOSPITAL ENCOUNTER (OUTPATIENT)
Dept: PHYSICAL THERAPY | Age: 56
Setting detail: THERAPIES SERIES
Discharge: HOME | End: 2021-08-31
Payer: COMMERCIAL

## 2021-08-31 PROCEDURE — 97110 THERAPEUTIC EXERCISES: CPT

## 2021-08-31 PROCEDURE — 97535 SELF CARE MNGMENT TRAINING: CPT

## 2021-08-31 NOTE — DISCHARGE SUMMARY
[address] [facility]  ONCOLOGY REHABILITATION  PHYSICAL THERAPY  [] DAILY NOTE [] PROGRESS NOTE [x] DISCHARGE NOTE    [x] JAIL[facility]     Date: 2021  Patient Name:  Jaylene Joe  : 1965  MRN: [id number]    Referring Practitioner Johnette Felty, MD with Dr. Maryam Rayo to follow   Diagnosis Malignant neoplasm of upper-outer quadrant of right female breast [C50.411]    Treatment Diagnosis Postural Abnormality, Z71.9   Date of Evaluation 21    Additional Pertinent History COVID-19       Functional Outcome Measure Used O: QuickDASH   Functional Outcome Score 0% (21)   0% (21)       Insurance: Primary: Payor: Musa Lara 150 /  /  / ,   Secondary:    Authorization Information: Aquatics and modalities approved, no ionto   Visit # 2, 2/10 for progress note   [facility]   Recertification Date:    Physician Follow-Up: 9/10/21 Rad Onc consult - [place]   Physician Orders: Prehab   History of Present Illness: 21 R DCIS ER/[place]-, 21 R lumpectomy, 21 re-excision        SUBJECTIVE: Pt reports followed up with Dr. Yolanda Matson and states she is able to omit chemo and will not need anti-hormonal treatment. To follow up with Rad Onc. Admits healing well from surgery but initially had significant swelling and hematoma. Has been compliant with HEP. TREATMENT   Precautions:    Pain: 0/10    X in shaded column indicates activity completed today   Modalities Parameters/  Location  Notes         Manual Therapy Time/Technique  Notes         Exercise/Intervention   Notes   Pec stretches 3x15 seconds  x Hands cheek height and with elbows extended overhead                                                                           Specific Interventions for Next Treatment: N/A    Activity Tolerance: Patient tolerated treatment well    ASSESSMENT:  Assessment: Pt with full return to baseline shoulder PROM and strength. Perfect QuickDASH score.  As pt did not have a SLNB, did not re-measure UE for lymphedema. Discussed that only concern comes from potential edema at breast from radiation. Reviewed previous HEP and educated to discontinue and transition to updated HEP with pec stretches once radiation begins. Educated to radiation potential side effects: fatigue, shoulder tightness, skin irritation, and treatment site edema. Pt without any current deficits and has good understanding of how to reduce her risk. No further therapy recommended at this time. GOALS:  Patient Goal: Get back to normal after surgery     Short Term Goals to be met in 12 weeks:  1. See LTGs     Long Term Goals to be met in 12 weeks:   1. Pt to demo right shoulder PROM flexion returned to 180 deg after surgery for ADLs. GOAL MET:  180 deg. Discontinue Goal    2. Pt to demo right shoulder PROM abduction returned to 180 deg after surgery for ADLs. GOAL MET:  180 deg. Discontinue Goal    3. Pt to demo right shoulder strength returned to 4+/5 after surgery for IADLs. GOAL MET:  4+/5 B shoulders. Discontinue Goal    4. Pt to demo QuickDASH score returned to 0% after surgery for return to previous level of functioning for survivorship. GOAL MET:  0%. Discontinue Goal    5. Pt to demo right UE lymphedema measures controlled at <131.6 cm with independence with lymphedema risk reduction strategies for safety after discharge. GOAL MET:  Pt did not have a SLNB and therefore is not at risk for lymphedema at this time. Discussed potential edema possible with radiation to breast tissue. .  Discontinue Goal      Patient Education: Update in HEP to stop previous and transition to pec stretches x2 methods in doorway 15 seconds x3 2x/day once radiation begins. Educated to common side effects of radiation (fatigue, skin irritation, shoulder tightness and edema at treatment site) and how to reduce risk. Progress towards goals and recommendation of discharge at this time, pt in agreement.   Education Outcome:

## 2021-09-10 ENCOUNTER — HOSPITAL ENCOUNTER (OUTPATIENT)
Dept: RADIATION ONCOLOGY | Age: 56
Discharge: HOME | End: 2021-09-10
Payer: COMMERCIAL

## 2021-09-10 VITALS
DIASTOLIC BLOOD PRESSURE: 57 MMHG | TEMPERATURE: 98.2 F | WEIGHT: 120.4 LBS | RESPIRATION RATE: 16 BRPM | HEART RATE: 71 BPM | HEIGHT: 61 IN | OXYGEN SATURATION: 100 % | SYSTOLIC BLOOD PRESSURE: 109 MMHG | BODY MASS INDEX: 22.73 KG/M2

## 2021-09-10 PROCEDURE — 99202 OFFICE O/P NEW SF 15 MIN: CPT | Performed by: RADIOLOGY

## 2021-09-10 PROCEDURE — 99204 OFFICE O/P NEW MOD 45 MIN: CPT | Performed by: RADIOLOGY

## 2021-09-10 NOTE — PROGRESS NOTES
[address] [facility] [address], [address]        [place],  [facility]        Vikki Gia: [phone]        F: [phone]       [email or web address]            INITIAL CONSULTATION    Date of Service: 9/10/2021  Patient ID: Lencho    : 1965  MRN: [id number]   Acct Number: [de-identified]         Requesting Provider: Dr. Kevin England (Surgery)  Reason for request: Evaluation for the potential role of adjuvant radiation therapy. CONSULTANT: Kari El MD    CHIEF COMPLAINT: Evaluation for the potential role of adjuvant radiation therapy. ASSESSMENT:  Cancer Staging  Ductal carcinoma in situ (DCIS) of right breast  Staging form: Breast, AJCC 8th Edition  - Pathologic stage from 2021: Stage 0 (pTis (DCIS), pN0, cM0, G3, ER-, NH-) - Signed by Lele Bull MD on 2021    PLAN:  With regards to radiation to the right breast, I discussed the possible short-term side effects of skin irritation (causing redness, dryness, or peeling), swelling and tenderness of the right breast, tiredness, low blood counts (causing infection or bleeding) and hair loss in treated area. Possible long-term side effects discussed included:  change in the cosmetic appearance of the right breast (change in shape, size, and texture of the breast), hyper/hypo-pigmentation of the skin,   scarring of the lung (causing shortness of breath and cough), swelling of the right arm i.e. lymphedema (causing pain), damage to the nerves (causing numbness, weakness, or paralysis) and rib fracture.    -Mrs. Kady Loco presents today doing well overall continues to heal well from recent surgery with no new treatment related complaints  -We reviewed her most recent pathology and treatment plan with all of her questions and concerns answered and addressed  -Recommend course of adjuvant radiation therapy as a part of breast conservation therapy  -We will allow her more time to heal prior paternal cousin history of ovarian cancer at a very young age. Right breast, lumpectomy:   Ductal carcinoma in situ, high nuclear grade, cribriform, solid, and   comedo types.     Carcinoma in situ is >=6 mm in greatest dimension and present       within 7 of 14 blocks.       Negative for invasive carcinoma.       DCIS is present at the medial margin (unifocal, less than 1 mm).     DCIS is 2 mm to the anterior margin.       Changes consistent with previous biopsy site.       Pseudoangiomatous stromal hyperplasia.     pTis      Estrogen Receptor (ER)    Estrogen Receptor (ER) Status    Negative     Progesterone Receptor (PgR)    Progesterone Receptor (PgR) Status    Negative     Ms. Lencho Gomez is a 55 y.o. female with above mentioned oncologic history presenting today for initial consultation regarding the potential role for radiation therapy.    -Continues to heal well from recent surgery, denies pain or redness in treatment area  -Denies swelling in the Right arm, has good range of motion  -She has no other general complaints at this time and is otherwise well    REVIEW OF SYSTEMS: AS per HPI above. PHYSICAL EXAMINATION:   VITAL SIGNS: BP (!) 109/57   Pulse 71   Temp 98.2 °F (36.8 °C) (Infrared)   Resp 16   Ht 5' 0.98\" (1.549 m)   Wt 120 lb 6.4 oz (54.6 kg)   SpO2 100%   BMI 22.76 kg/m²     ECO - Asymptomatic (Fully active, able to carry on all pre-disease activities without restriction)    PAIN: 0/10    General: NAD, AO x 3, Mentation is clear with appropriate affect.   HEENT: Normocephalic, atraumatic  Thorax:  Unlabored  Breasts:  Treated breast, normal appearance, no significant skin changes in treatment area  Breasts:  Limited breast exam performed, Right breast, non-tender, firmness noted in area of recent surgery, otherwise soft, healing well, surgical site clean/dry/intact  Abdomen:  Non-distended  Neuro:  Cranial nerves grossly intact; no focal deficits    Past Medical History: Financial Resource Strain:     Difficulty of Paying Living Expenses:    Food Insecurity:     Worried About Running Out of Food in the Last Year:     [address] in the Last Year:    Transportation Needs:     Lack of Transportation (Medical):  Lack of Transportation (Non-Medical):    Physical Activity:     Days of Exercise per Week:     Minutes of Exercise per Session:    Stress:     Feeling of Stress :    Social Connections:     Frequency of Communication with Friends and Family:     Frequency of Social Gatherings with Friends and Family:     Attends Anabaptism Services:     Active Member of Clubs or Organizations:     Attends Club or Organization Meetings:     Marital Status:    Intimate Partner Violence:     Fear of Current or Ex-Partner:     Emotionally Abused:     Physically Abused:     Sexually Abused:        No Known Allergies     Current Outpatient Medications   Medication Sig Dispense Refill    MAGNESIUM PO Take by mouth daily       No current facility-administered medications for this encounter. No outpatient medications have been marked as taking for the 9/10/21 encounter [facility] Encounter) with Ansley Dhaliwal MD.       LABORATORY STUDIES:   Onc labs: No results found for: PSA, CEA, LDH, AFP    PATHOLOGY: As per HPI above. RADIOLOGIC STUDIES: As per HPI above. Electronically signed by Lavern Walker MD on 9/10/21 at 10:24 AM EDT     ATTESTATION: 45 minutes were spent with the patient at today's visit reviewing pertinent information related to their oncologic diagnosis, including any recent labs, imaging, follow ups and plan of care going forward.     CC:Dr. Unique Helms ([facility]) Dr. Italia Tovar (Surgery)   ACC:[facility]'s Cancer Registry

## 2021-09-10 NOTE — PROGRESS NOTES
Selam Fischergeraldonsmichael  9/10/2021    Chaperone: No    Advance Directives     Power of  Living Will ACP-Advance Directive ACP-Power of     Not on File Not on File Not on File Not on File          Vitals:    09/10/21 0824   BP: (!) 109/57   Pulse: 71   Resp: 16   Temp: 98.2 °F (36.8 °C)   SpO2: 100%       Wt Readings from Last 1 Encounters:   09/10/21 120 lb 6.4 oz (54.6 kg)        Lab Results   Component Value Date    CREATININE 0.7 02/10/2021     Lab Results   Component Value Date    BUN 16 02/10/2021       Mediport: no    Pacemaker/ICD: no    Previous XRT: no    Past Medical History:   Diagnosis Date    Breast cancer ([address] 07/2021    right    COVID-19 11/2020     Past Surgical History:   Procedure Laterality Date    BREAST LUMPECTOMY Right 8/11/2021    RIGHT BREAST LUMPECTOMY, PREOP MAG SEED PLACEMENT performed by Chaitanya Montenegro MD at [facility] 8/17/2021    RE-EXCISION RIGHT BREAST MARGINS performed by Chaitanya Montenegro MD at [address] [place] [address]    Dr Ruben Pacheco  04/21/2017    Dr. Pratima Peters MALIHA STEROTACTIC LOC BREAST BIOPSY RIGHT Right 07/12/2021    MALIHA STEROTACTIC LOC BREAST BIOPSY RIGHT 7/12/2021 Sonya Conley MD [address] GUIDED NEEDLE LOC OF RIGHT BREAST Right 8/9/2021    US GUIDED NEEDLE LOC OF RIGHT BREAST 8/9/2021 Martín Jonas MD [facility]       No Known Allergies       Current Outpatient Medications:     MAGNESIUM PO, Take by mouth daily, Disp: , Rfl:       ADDITIONAL COMMENTS: Patient here for consult with Dr. Tiffani Sherman.        Suzy Ross, RN BSN

## 2021-09-21 ENCOUNTER — HOSPITAL ENCOUNTER (OUTPATIENT)
Dept: RADIATION ONCOLOGY | Age: 56
Discharge: HOME | End: 2021-09-21
Attending: RADIOLOGY
Payer: COMMERCIAL

## 2021-09-21 ENCOUNTER — HOSPITAL ENCOUNTER (OUTPATIENT)
Dept: CT IMAGING | Age: 56
Discharge: HOME | End: 2021-09-21
Payer: COMMERCIAL

## 2021-09-21 DIAGNOSIS — D05.11 INTRADUCTAL CARCINOMA IN SITU OF RIGHT BREAST: ICD-10-CM

## 2021-09-21 PROCEDURE — 77290 THER RAD SIMULAJ FIELD CPLX: CPT | Performed by: RADIOLOGY

## 2021-09-21 PROCEDURE — 77332 RADIATION TREATMENT AID(S): CPT | Performed by: RADIOLOGY

## 2021-09-21 PROCEDURE — 3209999900 CT GUIDE RADIATION THERAPY NO CHARGE

## 2021-09-21 PROCEDURE — 77263 THER RADIOLOGY TX PLNG CPLX: CPT | Performed by: RADIOLOGY

## 2021-09-23 ASSESSMENT — ENCOUNTER SYMPTOMS
EYE DISCHARGE: 0
TROUBLE SWALLOWING: 0
ABDOMINAL DISTENTION: 0
DIARRHEA: 0
CHEST TIGHTNESS: 0
BLOOD IN STOOL: 0
SHORTNESS OF BREATH: 0
COLOR CHANGE: 0
VOMITING: 0
WHEEZING: 0
SORE THROAT: 0
BACK PAIN: 0
NAUSEA: 0
FACIAL SWELLING: 0
ABDOMINAL PAIN: 0
RECTAL PAIN: 0
COUGH: 0
CONSTIPATION: 0

## 2021-09-28 PROCEDURE — 77334 RADIATION TREATMENT AID(S): CPT | Performed by: RADIOLOGY

## 2021-09-28 PROCEDURE — 77300 RADIATION THERAPY DOSE PLAN: CPT | Performed by: RADIOLOGY

## 2021-09-28 PROCEDURE — 77295 3-D RADIOTHERAPY PLAN: CPT | Performed by: RADIOLOGY

## 2021-09-29 ENCOUNTER — HOSPITAL ENCOUNTER (OUTPATIENT)
Dept: RADIATION ONCOLOGY | Age: 56
End: 2021-09-29
Attending: RADIOLOGY
Payer: COMMERCIAL

## 2021-09-29 PROCEDURE — 77334 RADIATION TREATMENT AID(S): CPT | Performed by: RADIOLOGY

## 2021-10-04 ENCOUNTER — HOSPITAL ENCOUNTER (OUTPATIENT)
Dept: RADIATION ONCOLOGY | Age: 56
Discharge: HOME | End: 2021-10-04
Attending: RADIOLOGY
Payer: COMMERCIAL

## 2021-10-04 DIAGNOSIS — D05.11 DUCTAL CARCINOMA IN SITU (DCIS) OF RIGHT BREAST: Primary | ICD-10-CM

## 2021-10-04 PROCEDURE — 77336 RADIATION PHYSICS CONSULT: CPT | Performed by: RADIOLOGY

## 2021-10-04 PROCEDURE — 77280 THER RAD SIMULAJ FIELD SMPL: CPT | Performed by: RADIOLOGY

## 2021-10-04 PROCEDURE — 77412 RADIATION TX DELIVERY LVL 3: CPT | Performed by: RADIOLOGY

## 2021-10-05 ENCOUNTER — HOSPITAL ENCOUNTER (OUTPATIENT)
Dept: RADIATION ONCOLOGY | Age: 56
Discharge: HOME | End: 2021-10-05
Attending: RADIOLOGY
Payer: COMMERCIAL

## 2021-10-05 PROCEDURE — 77412 RADIATION TX DELIVERY LVL 3: CPT | Performed by: RADIOLOGY

## 2021-10-06 ENCOUNTER — HOSPITAL ENCOUNTER (OUTPATIENT)
Dept: RADIATION ONCOLOGY | Age: 56
Discharge: HOME | End: 2021-10-06
Attending: RADIOLOGY
Payer: COMMERCIAL

## 2021-10-06 PROCEDURE — 77412 RADIATION TX DELIVERY LVL 3: CPT | Performed by: RADIOLOGY

## 2021-10-07 ENCOUNTER — HOSPITAL ENCOUNTER (OUTPATIENT)
Dept: RADIATION ONCOLOGY | Age: 56
Discharge: HOME | End: 2021-10-07
Attending: RADIOLOGY
Payer: COMMERCIAL

## 2021-10-07 PROCEDURE — 77412 RADIATION TX DELIVERY LVL 3: CPT | Performed by: RADIOLOGY

## 2021-10-08 ENCOUNTER — HOSPITAL ENCOUNTER (OUTPATIENT)
Dept: RADIATION ONCOLOGY | Age: 56
Discharge: HOME | End: 2021-10-08
Attending: RADIOLOGY
Payer: COMMERCIAL

## 2021-10-08 PROCEDURE — 77427 RADIATION TX MANAGEMENT X5: CPT | Performed by: RADIOLOGY

## 2021-10-08 PROCEDURE — 77412 RADIATION TX DELIVERY LVL 3: CPT | Performed by: RADIOLOGY

## 2021-10-11 ENCOUNTER — HOSPITAL ENCOUNTER (OUTPATIENT)
Dept: RADIATION ONCOLOGY | Age: 56
Discharge: HOME | End: 2021-10-11
Attending: RADIOLOGY
Payer: COMMERCIAL

## 2021-10-11 PROCEDURE — 77336 RADIATION PHYSICS CONSULT: CPT | Performed by: RADIOLOGY

## 2021-10-11 PROCEDURE — 77412 RADIATION TX DELIVERY LVL 3: CPT | Performed by: RADIOLOGY

## 2021-10-11 PROCEDURE — 77417 THER RADIOLOGY PORT IMAGE(S): CPT | Performed by: RADIOLOGY

## 2021-10-12 ENCOUNTER — HOSPITAL ENCOUNTER (OUTPATIENT)
Dept: RADIATION ONCOLOGY | Age: 56
Discharge: HOME | End: 2021-10-12
Attending: RADIOLOGY
Payer: COMMERCIAL

## 2021-10-12 PROCEDURE — 77412 RADIATION TX DELIVERY LVL 3: CPT | Performed by: RADIOLOGY

## 2021-10-13 ENCOUNTER — HOSPITAL ENCOUNTER (OUTPATIENT)
Dept: RADIATION ONCOLOGY | Age: 56
Discharge: HOME | End: 2021-10-13
Attending: RADIOLOGY
Payer: COMMERCIAL

## 2021-10-13 PROCEDURE — 77412 RADIATION TX DELIVERY LVL 3: CPT | Performed by: RADIOLOGY

## 2021-10-14 ENCOUNTER — HOSPITAL ENCOUNTER (OUTPATIENT)
Dept: RADIATION ONCOLOGY | Age: 56
Discharge: HOME | End: 2021-10-14
Attending: RADIOLOGY
Payer: COMMERCIAL

## 2021-10-14 PROCEDURE — 99212 OFFICE O/P EST SF 10 MIN: CPT | Performed by: NURSE PRACTITIONER

## 2021-10-14 PROCEDURE — 99214 OFFICE O/P EST MOD 30 MIN: CPT | Performed by: NURSE PRACTITIONER

## 2021-10-14 PROCEDURE — 77412 RADIATION TX DELIVERY LVL 3: CPT | Performed by: RADIOLOGY

## 2021-10-15 ENCOUNTER — HOSPITAL ENCOUNTER (OUTPATIENT)
Dept: RADIATION ONCOLOGY | Age: 56
Discharge: HOME | End: 2021-10-15
Attending: RADIOLOGY
Payer: COMMERCIAL

## 2021-10-15 PROCEDURE — 77412 RADIATION TX DELIVERY LVL 3: CPT | Performed by: RADIOLOGY

## 2021-10-15 PROCEDURE — 77427 RADIATION TX MANAGEMENT X5: CPT | Performed by: RADIOLOGY

## 2021-10-18 ENCOUNTER — HOSPITAL ENCOUNTER (OUTPATIENT)
Dept: RADIATION ONCOLOGY | Age: 56
Discharge: HOME | End: 2021-10-18
Attending: RADIOLOGY
Payer: COMMERCIAL

## 2021-10-18 PROCEDURE — 77417 THER RADIOLOGY PORT IMAGE(S): CPT | Performed by: RADIOLOGY

## 2021-10-18 PROCEDURE — 77412 RADIATION TX DELIVERY LVL 3: CPT | Performed by: RADIOLOGY

## 2021-10-18 PROCEDURE — 77336 RADIATION PHYSICS CONSULT: CPT | Performed by: RADIOLOGY

## 2021-10-19 ENCOUNTER — HOSPITAL ENCOUNTER (OUTPATIENT)
Dept: RADIATION ONCOLOGY | Age: 56
Discharge: HOME | End: 2021-10-19
Attending: RADIOLOGY
Payer: COMMERCIAL

## 2021-10-19 PROCEDURE — 77412 RADIATION TX DELIVERY LVL 3: CPT | Performed by: RADIOLOGY

## 2021-10-20 ENCOUNTER — HOSPITAL ENCOUNTER (OUTPATIENT)
Dept: RADIATION ONCOLOGY | Age: 56
Discharge: HOME | End: 2021-10-20
Attending: RADIOLOGY
Payer: COMMERCIAL

## 2021-10-20 PROCEDURE — 77412 RADIATION TX DELIVERY LVL 3: CPT | Performed by: RADIOLOGY

## 2021-10-21 ENCOUNTER — HOSPITAL ENCOUNTER (OUTPATIENT)
Dept: RADIATION ONCOLOGY | Age: 56
Discharge: HOME | End: 2021-10-21
Attending: RADIOLOGY
Payer: COMMERCIAL

## 2021-10-21 PROCEDURE — 77412 RADIATION TX DELIVERY LVL 3: CPT | Performed by: RADIOLOGY

## 2021-10-22 ENCOUNTER — HOSPITAL ENCOUNTER (OUTPATIENT)
Dept: RADIATION ONCOLOGY | Age: 56
Discharge: HOME | End: 2021-10-22
Attending: RADIOLOGY
Payer: COMMERCIAL

## 2021-10-22 PROCEDURE — 77412 RADIATION TX DELIVERY LVL 3: CPT | Performed by: RADIOLOGY

## 2021-10-22 PROCEDURE — 77427 RADIATION TX MANAGEMENT X5: CPT | Performed by: RADIOLOGY

## 2021-10-25 ENCOUNTER — HOSPITAL ENCOUNTER (OUTPATIENT)
Dept: RADIATION ONCOLOGY | Age: 56
Discharge: HOME | End: 2021-10-25
Attending: RADIOLOGY
Payer: COMMERCIAL

## 2021-10-25 PROCEDURE — 77412 RADIATION TX DELIVERY LVL 3: CPT | Performed by: RADIOLOGY

## 2021-10-25 PROCEDURE — 77417 THER RADIOLOGY PORT IMAGE(S): CPT | Performed by: RADIOLOGY

## 2021-10-25 NOTE — PROGRESS NOTES
[address] [facility] [address], [address]        JATIN DEBORAH PAINTING II.VIERT[place],  [facility]        Payton Regan: [phone]        F: [phone]       [email or web address]     END OF TREATMENT SUMMARY    Date of Service: 10/25/2021  Patient ID: Tyron Dempsey   : 1965  MRN: [id number]   Acct Number: [de-identified]           DIAGNOSIS:   Cancer Staging  Ductal carcinoma in situ (DCIS) of right breast  Staging form: Breast, AJCC 8th Edition  - Pathologic stage from 2021: Stage 0 (pTis (DCIS), pN0, cM0, G3, ER-, PA-) - Signed by Shelby Haddad MD on 2021      HISTORY OF PRESENT ILLNESS:     As per [facility] on 2021:  Radha is a 55 y. o. year old female who is presenting today in the office for evaluation of newly diagnosed high-grade ductal carcinoma in situ of the upper outer quadrant of the right breast it was ER/PA negative. Adonis Freeman denied any breast symptoms and presented for screening mammography. She has very, extremely dense breast which lowers the sensitivity of her mammography. [place] Lauren was a possible cluster of calcifications in the upper outer breast posterior depth.  On diagnostic imaging there is a 1 cm cluster of pleomorphic calcifications in the posterior depth. [place] Lauren is a second cluster just inferior and posterior to the first cluster.  She underwent stereotactic core biopsy which revealed a high-grade ductal carcinoma in situ which was ER and PA negative.  Patient had not had previous breast biopsies.  Her sister was diagnosed with triple negative breast cancer at age 40.  Her sister underwent genetic testing and no deleterious mutation was identified. Geoff Ray was treated at [facility]. Shelly Teran has no significant medical comorbidities.     Menarche age 16.  .  She had her children at age 33 and 28.  She breast-fed both children.  She took oral contraceptive medications for 15 years.  Menopause age 47 no history of estrogen replacement therapy.  Sister 44 history evening. ECO - Asymptomatic (Fully active, able to carry on all pre-disease activities without restriction)    Dhaval Licona tolerated radiation therapy well with only mild treatment related symptoms as detailed above. Radiation therapy was completed as planned without any significant treatment breaks or suspensions. Any treatment effects experienced at completion of therapy should improve or resolve in the next 2-3 weeks. Continue symptom management, if required, as instructed on the last day of treatment. Systemic Therapy: None      Follow Up Plan: Patient tolerated radiation therapy well overall with only mild side effects noted above. Continue regular follow up with all other treating physicians. The patient will return to clinic in 1 month or sooner if clinically indicated. They have our clinic number to call with any questions or concerns if needed. Thank you for allowing us to be a part of their care.     Electronically signed by Danilo Amezcua MD on 10/25/2021 at 9:14 AM  Radiation Oncology    CC:  Dr. Collette Pillow ([facility]) Dr. Gordon Duran (Surgery)   [facility]: Tumor Registry: [address] [facility]

## 2021-11-23 ENCOUNTER — HOSPITAL ENCOUNTER (OUTPATIENT)
Dept: RADIATION ONCOLOGY | Age: 56
Discharge: HOME | End: 2021-11-23
Attending: RADIOLOGY
Payer: COMMERCIAL

## 2021-11-23 VITALS
OXYGEN SATURATION: 100 % | BODY MASS INDEX: 22.16 KG/M2 | TEMPERATURE: 98.3 F | RESPIRATION RATE: 16 BRPM | HEART RATE: 79 BPM | SYSTOLIC BLOOD PRESSURE: 101 MMHG | WEIGHT: 117.2 LBS | DIASTOLIC BLOOD PRESSURE: 57 MMHG

## 2021-11-23 PROCEDURE — 99212 OFFICE O/P EST SF 10 MIN: CPT | Performed by: RADIOLOGY

## 2021-11-23 NOTE — PROGRESS NOTES
[address] [facility])         [address], [address]        [facility], [address] [facility]        Sharron Cheney: [phone]        F: [phone]       [email or web address]          FOLLOW UP    Date of Service: 2021  Patient ID: Eyal Joe   : 1965  MRN: [id number]   Acct Number: [de-identified]       DATE OF SERVICE: 2021   LOCATION: [facility]  PROVIDER: Kristi Porter MD    FOLLOW UP PHYSICIANS: Dr. Mayelin Estrella ([facility]) Dr. Pardeep Gandara (Surgery)    ASSESSMENT AND PLAN:  Cancer Staging  Ductal carcinoma in situ (DCIS) of right breast  Staging form: Breast, AJCC 8th Edition  - Pathologic stage from 2021: Stage 0 (pTis (DCIS), pN0, cM0, G3, ER-, [place]-) - Signed by Chely Bowden MD on 2021    Patient presents today doing well overall with no new general complaints. Patient is well healed from recent adjuvant radiation therapy treating the right breast.  Patient has good follow-up with both breast surgery as well as medical oncology. Patient is due for repeat mammogram in approximately 6 months time in 2022, will continue to follow. The patient will return to clinic in 6 months or sooner if clinically indicated. They have our clinic number to call with any questions or concerns if needed. Thank you for allowing us to be a part of their care. HPI:  As per [facility] on 2021:  Radha is a 55 y. o. year old female who is presenting today in the office for evaluation of newly diagnosed high-grade ductal carcinoma in situ of the upper outer quadrant of the right breast it was ER/[place] negative. Korin Garcia denied any breast symptoms and presented for screening mammography.  She has very, extremely dense breast which lowers the sensitivity of her mammography. Lorenzochery Cogan was a possible cluster of calcifications in the upper outer breast posterior depth.  On diagnostic imaging there is a 1 cm cluster of pleomorphic calcifications in the posterior depth. Zachery Cogan is a second cluster just inferior and posterior to the first cluster.  She underwent stereotactic core biopsy which revealed a high-grade ductal carcinoma in situ which was ER and OR negative.  Patient had not had previous breast biopsies.  Her sister was diagnosed with triple negative breast cancer at age 40. Christine Jiménez sister underwent genetic testing and no deleterious mutation was identified. Bonita Garcia was treated at [facility]. Berna Carmichael has no significant medical comorbidities.     Menarche age 16.  .  She had her children at age 33 and 28.  She breast-fed both children.  She took oral contraceptive medications for 15 years. Menopause age 47 no history of estrogen replacement therapy.  Sister 40 history of breast cancer.  Father history of prostate and lung cancer. Paternal grandmother history of uterine cancer paternal cousin history of ovarian cancer at a very young age.     Right breast, lumpectomy:   Ductal carcinoma in situ, high nuclear grade, cribriform, solid, and   comedo types.       Carcinoma in situ is >=6 mm in greatest dimension and present       within 7 of 14 blocks.       Negative for invasive carcinoma.       DCIS is present at the medial margin (unifocal, less than 1 mm).        DCIS is 2 mm to the anterior margin.       Changes consistent with previous biopsy site.       Pseudoangiomatous stromal hyperplasia.       pTis      Estrogen Receptor (ER)    Estrogen Receptor (ER) Status    Negative     Progesterone Receptor (PgR)    Progesterone Receptor (PgR) Status    Negative      Radiation Therapy:  Treatment Course Number: 1  Dates: 10/04/21 to 10/25/21     Treatment Site (s) Modality Dose (cGy) Fractions Elapsed Days   Right Whole Breast 3DRT 4256 16 21      Cumulative Dose: 4256 cGy     Radiation therapy delivered under the care of Dr. Vitaly Ventura MD MS ([facility])       INTERVAL HISTORY:  Alee Verma is a 55 y.o. female is presenting today for regularly scheduled follow up regarding the above mentioned oncologic history. She presents today doing well overall with no complaints with regards to the treated right breast.  Patient does note area of darkening in the treatment area, otherwise skin is well-healed. Patient has good range of motion on the ipsilateral side, denies swelling of the right arm. Patient otherwise well with no other specific general complaints on complete review of systems at this time. CHAPERONE: na    ROS:  A complete review of systems was performed and found to be negative except as presented above. LABORATORY STUDIES:    Onc labs: No results found for: PSA, CEA, LDH, AFP    MEDICATIONS:   No current outpatient medications on file. No current facility-administered medications for this encounter. EXAMINATION:  VITAL SIGNS: BP (!) 101/57   Pulse 79   Temp 98.3 °F (36.8 °C) (Infrared)   Resp 16   Wt 117 lb 3.2 oz (53.2 kg)   SpO2 100%   BMI 22.16 kg/m²     ECO - Asymptomatic (Fully active, able to carry on all pre-disease activities without restriction)    PAIN: 0/10    General: NAD, AO x 3, Mentation is clear with appropriate affect. HEENT: Normocephalic, atraumatic  Thorax:  Unlabored  Breasts:  Treated breast, normal appearance, no significant skin changes in treatment area  Breasts:  Mild-Moderate hyperpigmentatoin in RT area, skin intact. Abdomen:  Non-distended  Neuro:  Cranial nerves grossly intact; no focal deficits    Electronically signed by Castillo Lucas MD on 21 at 8:11 AM EST     ATTESTATION: 20 minutes were spent with the patient at today's visit reviewing pertinent information related to their oncologic diagnosis, including any recent labs, imaging, follow ups and plan of care going forward.     CC:Dr. John Braden ([facility]) Dr. Paige Gillette (Surgery)  ACC:[facility]

## 2021-12-02 ENCOUNTER — OFFICE VISIT (OUTPATIENT)
Dept: SURGERY | Age: 56
End: 2021-12-02
Payer: COMMERCIAL

## 2021-12-02 VITALS
HEIGHT: 60 IN | WEIGHT: 115 LBS | OXYGEN SATURATION: 99 % | SYSTOLIC BLOOD PRESSURE: 115 MMHG | TEMPERATURE: 97.2 F | BODY MASS INDEX: 22.58 KG/M2 | DIASTOLIC BLOOD PRESSURE: 67 MMHG | RESPIRATION RATE: 14 BRPM | HEART RATE: 65 BPM

## 2021-12-02 DIAGNOSIS — C50.911 DUCTAL CARCINOMA OF RIGHT BREAST (HCC): Primary | ICD-10-CM

## 2021-12-02 DIAGNOSIS — R92.2 DENSE BREAST TISSUE ON MAMMOGRAM: ICD-10-CM

## 2021-12-02 PROCEDURE — 99213 OFFICE O/P EST LOW 20 MIN: CPT | Performed by: SURGERY

## 2021-12-02 ASSESSMENT — ENCOUNTER SYMPTOMS
SORE THROAT: 0
COLOR CHANGE: 0
BLOOD IN STOOL: 0
NAUSEA: 0
ABDOMINAL PAIN: 0
COUGH: 0
WHEEZING: 0
SHORTNESS OF BREATH: 0
VOMITING: 0

## 2021-12-02 NOTE — PROGRESS NOTES
Susana Caraballo MD   General Surgery  Follow up  Patient Evaluation in Office  Pt Name: Adrianne Villalobos  Date of Birth 1965   Today's Date: 12/2/2021  Medical Record Number: [id number]  Referring Provider: No ref. provider found  Primary Care Provider: ANNA Medrano CNP  Chief Complaint:  Chief Complaint   Patient presents with    3 Month Follow-Up     Ductal carcinoma of right breast ([address]      Stage 0 high-grade ER negative, VT negative  ASSESSMENT      1. Ductal carcinoma of right breast ([address]    2. Dense breast tissue on mammogram         PLANS     1. Clinical breast examination benign. 2.  Breast cancer survivorship plan discussed. 3.  Follow-up surgical clinic in 6 months. 4.  Follow-up radiation oncology as instructed. 5.  Bilateral mammography next April. 6.  Encourage self breast examinations call for any changes or symptoms. SUBJECTIVE   Of present illness:  Sagar Mc is a 55y.o. year old female who is presenting today in the office for follow-up evaluation of  high-grade ductal carcinoma in situ of the upper outer quadrant of the right breast it was ER/VT negative. Sagar Mc denied any breast symptoms and presented for screening mammography. She has very, extremely dense breast which lowers the sensitivity of her mammography. There was a possible cluster of calcifications in the upper outer breast posterior depth. On diagnostic imaging there is a 1 cm cluster of pleomorphic calcifications in the posterior depth. There is a second cluster just inferior and posterior to the first cluster. She underwent stereotactic core biopsy which revealed a high-grade ductal carcinoma in situ which was ER and VT negative. Patient had not had previous breast biopsies. Her sister was diagnosed with triple negative breast cancer at age 40. Her sister underwent genetic testing and no deleterious mutation was identified. She was treated at [facility].   Tara opted for breast conservation therapy and underwent a right breast lumpectomy. Was high-grade cribriform and solid and comedo types. It was 6 mm in greatest dimension. Margins were focally positive medially and she underwent reexcision of the medial margin which was negative for malignancy. She saw Dr. Susie Vigil of radiation oncology and underwent a course of external beam radiation to the right breast with 16 fractions given total of 4256 cGy. She completed therapy 2021. In the interval she denies any issues. She has no significant residual radiation changes in the right breast she has good range of motion no edema of the breast.  Bilateral mammography will be performed next year. She denies any new health issues or new cancer diagnoses in her family. She denies any significant breast pain or nipple discharge. Menarche age 16. . She had her children at age 33 and 28. She breast-fed both children. She took oral contraceptive medications for 15 years. Menopause age 47 no history of estrogen replacement therapy. Sister 40 history of breast cancer. Father history of prostate and lung cancer. Paternal grandmother history of uterine cancer paternal cousin history of ovarian cancer at a very young age.     Past Medical History  Past Medical History:   Diagnosis Date    Breast cancer ([facility] 2021    right    COVID-19 2020       Past Surgical History  Past Surgical History:   Procedure Laterality Date    BREAST LUMPECTOMY Right 2021    RIGHT BREAST LUMPECTOMY, PREOP MAG SEED PLACEMENT performed by Nadine Jacob MD at [facility] Right 2021    RE-EXCISION RIGHT BREAST MARGINS performed by Nadine Jacob MD at [address] [place] [address]      Dr Oumou Humphreys  2017    Dr. Rickford Alpers [facility] LOC BREAST BIOPSY RIGHT Right 2021    [facility] BREAST BIOPSY RIGHT 2021 Teresa Vazquez MD [address] [place] [address] LOC OF RIGHT BREAST Right 8/9/2021    US GUIDED NEEDLE LOC OF RIGHT BREAST 8/9/2021 Hernando Lovelace MD [facility]       Medications  No current outpatient medications on file. No current facility-administered medications for this visit. Allergies   No Known Allergies    Family History  Family History   Problem Relation Age of Onset    Heart Disease Mother         with stents    Prostate Cancer Father         in his 63's   Jyoti Edmundo Lung Cancer Father     Breast Cancer Sister 40        bilat    No Known Problems Sister     No Known Problems Sister     No Known Problems Sister     No Known Problems Sister     Heart Disease Maternal Grandmother     Alzheimer's Disease Maternal Grandfather     Uterine Cancer Paternal Grandmother         >50    No Known Problems Paternal Grandfather     Ovarian Cancer Paternal Cousin 25       SocialHistory  Social History     Socioeconomic History    Marital status:      Spouse name: Edi Santana Number of children: 2    Years of education: Not on file    Highest education level: Not on file   Occupational History    Not on file   Tobacco Use    Smoking status: Never Smoker    Smokeless tobacco: Never Used   Vaping Use    Vaping Use: Never used   Substance and Sexual Activity    Alcohol use: Yes     Comment: occasionally    Drug use: Never    Sexual activity: Not on file   Other Topics Concern    Not on file   Social History Narrative    Not on file     Social Determinants of Health     Financial Resource Strain:     Difficulty of Paying Living Expenses: Not on file   Food Insecurity:     Worried About Running Out of Food in the Last Year: Not on file    Julia of Food in the Last Year: Not on file   Transportation Needs:     Lack of Transportation (Medical): Not on file    Lack of Transportation (Non-Medical):  Not on file   Physical Activity:     Days of Exercise per Week: Not on file    Minutes of Exercise per Session: Not on file Stress:     Feeling of Stress : Not on file   Social Connections:     Frequency of Communication with Friends and Family: Not on file    Frequency of Social Gatherings with Friends and Family: Not on file    Attends [employer] Services: Not on file    Active Member of [address] [place] [address] or Organizations: Not on file    Attends Club or Organization Meetings: Not on file    Marital Status: Not on file   Intimate Partner Violence:     Fear of Current or Ex-Partner: Not on file    Emotionally Abused: Not on file    Physically Abused: Not on file    Sexually Abused: Not on file   Housing Stability:     Unable to Pay for Housing in the Last Year: Not on file    Number of [place] in the Last Year: Not on file    Unstable Housing in the Last Year: Not on file           Review of Systems  Review of Systems   Constitutional: Negative for activity change, appetite change, chills, fatigue, fever and unexpected weight change. HENT: Negative for congestion and sore throat. Eyes: Negative for visual disturbance. Respiratory: Negative for cough, shortness of breath and wheezing. Cardiovascular: Negative for chest pain and palpitations. Gastrointestinal: Negative for abdominal pain, blood in stool, nausea and vomiting. Endocrine: Negative for cold intolerance, heat intolerance and polydipsia. Genitourinary: Negative for dysuria, flank pain, hematuria and vaginal bleeding. Musculoskeletal: Negative for arthralgias, gait problem, joint swelling and myalgias. Skin: Negative for color change and rash. Allergic/Immunologic: Negative for immunocompromised state. Neurological: Negative for dizziness, tremors, seizures, speech difficulty and weakness. Hematological: Does not bruise/bleed easily. Psychiatric/Behavioral: Negative for behavioral problems and confusion.        OBJECTIVE     /67 (Site: Left Upper Arm, Position: Sitting, Cuff Size: Medium Adult)   Pulse 65   Temp 97.2 °F (36.2 °C) jaundiced or pale. Findings: No rash. Neurological:      General: No focal deficit present. Mental Status: She is alert and oriented to person, place, and time. Cranial Nerves: No cranial nerve deficit. Psychiatric:         Behavior: Behavior normal.         Thought Content:  Thought content normal.         Lab Results   Component Value Date    WBC 5.1 07/22/2021    HGB 14.8 07/22/2021    HCT 46.2 07/22/2021     07/22/2021    CHOL 162 02/10/2021    TRIG 47 02/10/2021    HDL 94 02/10/2021    ALT 14 02/10/2021    AST 18 02/10/2021     02/10/2021    K 3.9 02/10/2021     02/10/2021    CREATININE 0.7 02/10/2021    BUN 16 02/10/2021    CO2 27 02/10/2021    TSH 2.490 02/10/2021

## 2022-02-15 ENCOUNTER — HOSPITAL ENCOUNTER (OUTPATIENT)
Dept: INFUSION THERAPY | Age: 57
Discharge: HOME | End: 2022-02-15
Payer: COMMERCIAL

## 2022-02-15 ENCOUNTER — OFFICE VISIT (OUTPATIENT)
Dept: ONCOLOGY | Age: 57
End: 2022-02-15
Payer: COMMERCIAL

## 2022-02-15 VITALS
DIASTOLIC BLOOD PRESSURE: 56 MMHG | TEMPERATURE: 98.6 F | SYSTOLIC BLOOD PRESSURE: 116 MMHG | HEART RATE: 118 BPM | RESPIRATION RATE: 16 BRPM

## 2022-02-15 VITALS
WEIGHT: 118 LBS | DIASTOLIC BLOOD PRESSURE: 56 MMHG | OXYGEN SATURATION: 98 % | SYSTOLIC BLOOD PRESSURE: 116 MMHG | TEMPERATURE: 98.6 F | HEART RATE: 77 BPM | HEIGHT: 61 IN | RESPIRATION RATE: 16 BRPM | BODY MASS INDEX: 22.28 KG/M2

## 2022-02-15 DIAGNOSIS — D05.11 DUCTAL CARCINOMA IN SITU (DCIS) OF RIGHT BREAST: Primary | ICD-10-CM

## 2022-02-15 PROCEDURE — 99215 OFFICE O/P EST HI 40 MIN: CPT | Performed by: NURSE PRACTITIONER

## 2022-02-15 PROCEDURE — 99211 OFF/OP EST MAY X REQ PHY/QHP: CPT

## 2022-02-15 NOTE — PATIENT INSTRUCTIONS
1.  Return to clinic in 6 mos to see Dr. Kirsten Leyden with labs  2. Mammogram due 6/30/2022. Order placed, please schedule.

## 2022-02-15 NOTE — PROGRESS NOTES
[facility], [address] [facility],[address]  [address] [address]  Dept: [phone]  Dept Fax: [phone]: [phone]      Visit Date:2/15/2022     Jony Gannon is a 55 y.o. female who presents today for:   Survivorship Visit    HPI:   Jony Gannon is a 55 y.o. female with history of breast cancer. The patient has completed treatment at [facility]. Her cancer team includes:  General Surgery: Dr. Fortino Kelley Oncology: Dr. Refugio Briones  Radiation Oncology: Dr. Vincent Nunez    Treatment history includes HPI from Dr. Vesna Matamoros last note on 8/25/2021:    Ke Jacobs denied any breast symptoms and presented for screening mammography. Chris Carmen has very, extremely dense breast which lowers the sensitivity of her mammography. Edwin Fuelling was a possible cluster of calcifications in the upper outer breast posterior depth.  On diagnostic imaging there is a 1 cm cluster of pleomorphic calcifications in the posterior depth. Edwin Fuelling is a second cluster just inferior and posterior to the first cluster.  She underwent stereotactic core biopsy which revealed a high-grade ductal carcinoma in situ which was ER and MN negative. Subsequently, she met with the surgeon Dr. Rashi Delvalle. After discussing her surgical treatment options she decided to proceed with right breast lumpectomy. She had surgery on August 11, 2021. Final pathology report showed  Right breast, lumpectomy:    Ductal carcinoma in situ, high nuclear grade, cribriform, solid, and   comedo types.     Carcinoma in situ is >=6 mm in greatest dimension and present       within 7 of 14 blocks.       Negative for invasive carcinoma.       DCIS is present at the medial margin (unifocal, less than 1 mm).     DCIS is 2 mm to the anterior margin.       Changes consistent with previous biopsy site.       Pseudoangiomatous stromal hyperplasia.        pTis      Estrogen Receptor (ER)     Negative   Progesterone Receptor (PgR)    Negative     Due to positive for medial margin she underwent reexcision on 2021. Pathology report showed: FINAL DIAGNOSIS:   Right breast, new medial margin, reexcision:    Changes consistent with prior procedure.    Glandular microcalcifications.    Negative for malignancy.      Menarche age 16.  .  She had her children at age 33 and 28.  She breast-fed both children.  She took oral contraceptive medications for 13 years.  Menopause age 47 no history of estrogen replacement therapy.  Sister 40 history of breast cancer.  Father history of prostate and lung cancer.  Paternal grandmother history of uterine cancer paternal cousin history of ovarian cancer at a very young age      Interval History 2/15/2022: Today, the patient presents for Survivorship visit. Her treatment history is summarized above. She continues regular follow up with her cancer team.  She states that she has felt well since her last visit in our office in 2021. She denies any changes to her overall health. She denies any ED visits or hospitalizations. She denies any concerns related to her breasts-no nipple inversion or discharge; no palpable lumps/masses. She denies unintentional weight loss, poor appetite, early satiety, new shortness of breath or bone pain. She denies any concerns with lymphedema-no chest wall or right upper extremity swelling. Has any family history changed since last visit? no      PMH, SH, and FH:  I reviewed the patients medication list and allergy list as noted on the electronic medical record. The PMH, SH and FH were also reviewed as noted on the EMR. Review of Systems:   Review of Systems   Pertinent review of systems noted in HPI, all other ROS negative. Objective:   Physical Exam   There were no vitals taken for this visit. General appearance: No apparent distress, calm and cooperative. HEENT: Pupils equal, round, and reactive to light. Conjunctivae/corneas clear.  Oral mucosa moist  Neck: Supple, with full range of motion. Trachea midline. Respiratory:  Normal respiratory effort. Clear to auscultation all lung fields. Cardiovascular:  RRR, S1/S2. Abdomen: Soft, non-tender, non-distended with active BS x 4. Musculoskeletal: No clubbing, cyanosis or edema bilaterally. She is able to ambulate in office without difficulty or use of assistive device. Skin: Skin color, texture, turgor normal.  No visible rashes or lesions. Neurologic:  Neurovascularly intact without any focal sensory/motor deficits. Psychiatric: Alert and oriented x 3, thought content appropriate, normal insight  Capillary Refill: Brisk, < 3 seconds   Peripheral Pulses: +2 palpable, equal bilaterally     Survivorship Recommendations: The patient has completed her breast cancer survivorship visit today. She was provided breast cancer survivorship care plan prepared by oncology nurse navigator and reviewed by myself. Care plan along with note from today's visit will be given to the patient's PCP ANNA Sierra CNP. No concerns vocalized at today's visit. 1. Screenings Recommended:    A. Annual Mammogram - Patient due on 6/30/2022. Ordered and scheduled at today's visit. B. Regular Gynecologic Follow Up   C. If on aromatase inhibitor: N/A    - DEXA Scan every 2 years - N/A    - Optimize bone health with calcium and vitamin D.   D. Colonoscopy Screening:  Last colonoscopy done 4/21/2017, due every 10 years unless otherwise directed due to previous findings. E. Lung Cancer Screening:  Non-smoker. 2. Education Provided:   A. Patient given handouts on surveillance and survivorship care. The patient will continue survivor follow-up including history, regular physical examination 1-4 times per year as clinically appropriate, then annually. Meeting with Medical Oncology, Radiation Oncology and General Surgery as deemed appropriate.  Radiologic imaging to screen for distant recurrence will be not performed unless the patient will develop new symptoms. B. Patient educated on potential symptoms of recurrence including:     ?Constitutional symptoms - Anorexia, weight loss, malaise, fatigue, insomnia. ? Bone pain  ? Pulmonary symptoms - persistent cough or dyspnea (at rest or with exertion). ?Neurologic symptoms - Headache, nausea, vomiting, confusion, weakness, numbness or tingling. ?Gastrointestinal symptoms - Right upper quadrant pain, change in bowel habits, presence of bloody or tarry stools. C. Dietary recommendations given and provided handouts today. As per NCCN, all survivors are encouraged to make informed choices about food intake, limit red meat to less than 18 oz per week, avoid processed meat. Limit refined sugars and processed foods. Eat diet that is at least 50% plant-based. Minimize alcohol intake. Patient encouraged to maintain a healthy weight. D. Exercise/Activity recommendations including: exercise 30 minutes 3-5 times per week including low-resistance weight training at least 2-3 times per week   E. Smoking Cessation if indicated:  Non-smoker. F. Possible cardiac toxicity related to breast cancer treatment. Recommend following with PCP regularly to assess cardiovascular risk. BP monitoring/management. Cholesterol monitoring/management especially if on AI. Smoking cessation. Diet and weight management, DM management. Echo/EKG based on individual risk   G: Immunizations: as recommended per CDC    3. Services/Referrals Provided:    A. Financial Navigation - Financial toxicity screen completed, No referral indicated. [facility] - Patient provided nutritional handouts prepared by dietician. Referral not indicated. C. Rehabilitation services including PT Referral, OT Referral, ST Referral as indicated. D. Psychological support including , pastoral care, support groups. E. Genetic Counselor Evaluation:  Completed with negative findings.    F. Education Opportunities - Patient was provided nutritional handout for breast cancer survivors. G. Smoking Cessation - Non-smoker. 4. Follow-Up/Goals   We discussed the importance of maintaining a healthy weight, nutrition, exercise, water intake, wearing SPF year-round, sleep, and regular follow up with her PCP. -Medical Oncology, Dr. Marychuy Tatum, on 8/17/2022  -Radiation Oncology, Dr. Ghulam Cuellar, on 5/4/2022  -General Surgery, Dr. Tahir Perez, on 6/9/2022      On this date 2/15/2022 I have spent 46 minutes reviewing previous notes, test results and face to face with the patient discussing the diagnosis and importance of compliance with the treatment plan as well as documenting on the day of the visit.     Electronically signed by   ANNA Yip CNP

## 2022-05-25 ENCOUNTER — HOSPITAL ENCOUNTER (OUTPATIENT)
Dept: RADIATION ONCOLOGY | Age: 57
Discharge: HOME | End: 2022-05-25
Attending: RADIOLOGY
Payer: COMMERCIAL

## 2022-05-25 VITALS
BODY MASS INDEX: 22.18 KG/M2 | SYSTOLIC BLOOD PRESSURE: 101 MMHG | TEMPERATURE: 97.9 F | OXYGEN SATURATION: 99 % | DIASTOLIC BLOOD PRESSURE: 59 MMHG | HEART RATE: 65 BPM | WEIGHT: 117.4 LBS | RESPIRATION RATE: 16 BRPM

## 2022-05-25 PROCEDURE — 99213 OFFICE O/P EST LOW 20 MIN: CPT | Performed by: RADIOLOGY

## 2022-05-25 PROCEDURE — 99212 OFFICE O/P EST SF 10 MIN: CPT | Performed by: RADIOLOGY

## 2022-05-25 NOTE — PROGRESS NOTES
[address], [address] [place] [address],[address]        [facility], [address] [facility]        Yasemin Bear: [phone]        F: [phone]       [email or web address]            FOLLOW UP NOTE    Date of Service: 2022  Patient ID: Edwin Cabrera   : 1965  MRN: [id number]   Acct Number: [de-identified]       DATE OF SERVICE: 2022   LOCATION: [facility]  PROVIDER: Ziyad Powell MD MS    FOLLOW UP PHYSICIANS: Dr. Jamaal Kramer ([facility]) Dr. Janel Sears (Surgery)    ASSESSMENT AND PLAN:  Cancer Staging  Ductal carcinoma in situ (DCIS) of right breast  Staging form: Breast, AJCC 8th Edition  - Pathologic stage from 2021: Stage 0 (pTis (DCIS), pN0, cM0, G3, ER-, MT-) - Signed by Claudia Isabel MD on 2021    -Continues to do well following adjuvant radiation therapy treating the right breast completed 2021  -No new complaints with regards to the treated right breast or the contralateral breast  -No features on physical exam concerning for recurrent disease  -Repeat bilateral mammogram ordered, will follow results, recommend continued annual surveillance  -Continue follow-up visits with all other treating physicians    The patient will return to clinic in 6 months or sooner if clinically indicated. They have our clinic number to call with any questions or concerns if needed. Thank you for allowing us to be a part of their care. HPI:  Radha is a 55 y. o. year old female who is presenting today in the office for evaluation of newly diagnosed high-grade ductal carcinoma in situ of the upper outer quadrant of the right breast it was ER/MT negative. Abdi Noland denied any breast symptoms and presented for screening mammography.  She has very, extremely dense breast which lowers the sensitivity of her mammography. [place] was a possible cluster of calcifications in the upper outer breast posterior depth.  On diagnostic imaging there is a 1 cm cluster of pleomorphic calcifications in the posterior depth. Eliseo Mclean is a second cluster just inferior and posterior to the first cluster.  She underwent stereotactic core biopsy which revealed a high-grade ductal carcinoma in situ which was ER and FL negative.  Patient had not had previous breast biopsies.  Her sister was diagnosed with triple negative breast cancer at age 40. Rachel Ruiz sister underwent genetic testing and no deleterious mutation was identified. Piotr Coyle was [facility] at [facility] WILLAM has no significant medical comorbidities.     Menarche age 16.  .  She had her children at age 33 and 28.  She breast-fed both children.  She took oral contraceptive medications for 15 years. Menopause age 47 no history of estrogen replacement therapy.  Sister 40 history of breast cancer.  Father history of prostate and lung cancer. Paternal grandmother history of uterine cancer paternal cousin history of ovarian cancer at a very young age.     Right breast, lumpectomy:   Ductal carcinoma in situ, high nuclear grade, cribriform, solid, and   comedo types.       Carcinoma in situ is >=6 mm in greatest dimension and present       within 7 of 14 blocks.       Negative for invasive carcinoma.       DCIS is present at the medial margin (unifocal, less than 1 mm).        DCIS is 2 mm to the anterior margin.       Changes consistent with previous biopsy site.       Pseudoangiomatous stromal hyperplasia.       pTis      Estrogen Receptor (ER)    Estrogen Receptor (ER) Status    Negative     Progesterone Receptor (PgR)    Progesterone Receptor (PgR) Status    Negative      Radiation Therapy:  Treatment Course Number: 1  Dates: 10/04/21 to 10/25/21     Treatment Site (s) Modality Dose (cGy) Fractions Elapsed Days   Right Whole Breast 3DRT 4256 16 21      Cumulative Dose: 4256 cGy     Radiation therapy delivered under the care of Dr. Willow Briceno MD MS ([facility])    INTERVAL HISTORY:  Santo Cramer is a 55 y.o. female is presenting today for regularly scheduled follow up regarding the above mentioned oncologic history. She is overall doing well with no general complaints. Her last adjuvant radiatio therapy was back in October to the right breast. She has good follow-up with both breast surgery as well as medical oncology. Patient is due for repeat mammogram in 2022, will continue to follow. Review of Systems   Constitutional: Negative for activity change, appetite change, fatigue and unexpected weight change. HENT: Negative for ear pain and trouble swallowing. Respiratory: Negative for cough and shortness of breath. Cardiovascular: Negative for chest pain and leg swelling. Gastrointestinal: Negative for abdominal pain, blood in stool, diarrhea, nausea and rectal pain. Genitourinary: Negative for dysuria, frequency, hematuria and urgency. Musculoskeletal: Negative for arthralgias, back pain and myalgias. Skin: Negative for rash and wound. Neurological: Negative for dizziness, weakness, light-headedness, numbness and headaches. Psychiatric/Behavioral: Negative for confusion. A complete review of systems was performed and found to be negative except as presented above. CHAPERONE: KANDICE Hector    PAIN: 0/10    LABORATORY STUDIES:  Onc labs: No results found for: PSA, CEA, LDH, AFP    MEDICATIONS:   No current outpatient medications on file. No current facility-administered medications for this encounter. PHYSICAL EXAMINATION:  VITAL SIGNS: BP (!) 101/59   Pulse 65   Temp 97.9 °F (36.6 °C) (Infrared)   Resp 16   Wt 117 lb 6.4 oz (53.3 kg)   SpO2 99%   BMI 22.18 kg/m²     ECO - Asymptomatic (Fully active, able to carry on all pre-disease activities without restriction)    Physical Exam  Vitals reviewed. Exam conducted with a chaperone present. Constitutional:       General: She is not in acute distress. Appearance: Normal appearance. She is not ill-appearing. HENT:      Head: Normocephalic and atraumatic.       Nose: Nose normal. Mouth/Throat:      Mouth: Mucous membranes are moist.      Pharynx: Oropharynx is clear. Cardiovascular:      Rate and Rhythm: Normal rate and regular rhythm. Pulses: Normal pulses. Heart sounds: No murmur heard. No friction rub. No gallop. Pulmonary:      Effort: Pulmonary effort is normal. No respiratory distress. Breath sounds: Normal breath sounds. No wheezing, rhonchi or rales. Chest:      Chest wall: No tenderness. Breasts:      Right: Normal. No swelling, inverted nipple, mass, nipple discharge, skin change, tenderness or axillary adenopathy. Left: Normal. No swelling, inverted nipple, mass, nipple discharge, skin change, tenderness or axillary adenopathy. Abdominal:      General: Bowel sounds are normal.      Palpations: Abdomen is soft. Tenderness: There is no abdominal tenderness. Musculoskeletal:         General: No swelling or tenderness. Normal range of motion. Cervical back: Normal range of motion and neck supple. Right lower leg: No edema. Left lower leg: No edema. Lymphadenopathy:      Upper Body:      Right upper body: No axillary adenopathy. Left upper body: No axillary adenopathy. Skin:     General: Skin is warm and dry. Capillary Refill: Capillary refill takes less than 2 seconds. Neurological:      General: No focal deficit present. Mental Status: She is alert and oriented to person, place, and time. Mental status is at baseline. Psychiatric:         Mood and Affect: Mood normal.         Behavior: Behavior normal.         Thought Content:  Thought content normal.         Judgment: Judgment normal.       Electronically signed by Cynthia Clay DO, PGY-2 on 5/25/22 at 8:52 AM EDT    Electronically signed by Yamel Chapman MD MS on 5/25/22 at 8:10 AM EDT     ATTESTATION: 20 minutes were spent with the patient at today's visit reviewing pertinent information related to their oncologic diagnosis, including any recent labs, imaging, follow ups and plan of care going forward. Attending Supervising Physicians Attestation Statement  I saw and evaluated the patient independently. I discussed the findings and plans with resident physician (Dr. Adi Guerrero) and agree as documented in his note after reviewing the patient chart carefully and prolonged discussion regarding the resident assessment and plan including adjustments that would benefit our patient.     CC:Dr. Ruth Espinal ([facility]) Dr. Tomasa Estevez (Surgery)  ACC:[facility]

## 2022-05-31 ENCOUNTER — APPOINTMENT (OUTPATIENT)
Dept: RADIATION ONCOLOGY | Age: 57
End: 2022-05-31
Attending: RADIOLOGY
Payer: COMMERCIAL

## 2022-06-01 ASSESSMENT — ENCOUNTER SYMPTOMS
RECTAL PAIN: 0
SHORTNESS OF BREATH: 0
COUGH: 0
ABDOMINAL PAIN: 0
DIARRHEA: 0
TROUBLE SWALLOWING: 0
BACK PAIN: 0
NAUSEA: 0
BLOOD IN STOOL: 0

## 2022-06-30 ENCOUNTER — OFFICE VISIT (OUTPATIENT)
Dept: SURGERY | Age: 57
End: 2022-06-30
Payer: COMMERCIAL

## 2022-06-30 ENCOUNTER — HOSPITAL ENCOUNTER (OUTPATIENT)
Dept: WOMENS IMAGING | Age: 57
Discharge: HOME | End: 2022-06-30
Payer: COMMERCIAL

## 2022-06-30 VITALS
TEMPERATURE: 97.3 F | WEIGHT: 117.5 LBS | RESPIRATION RATE: 16 BRPM | HEIGHT: 61 IN | BODY MASS INDEX: 22.19 KG/M2 | SYSTOLIC BLOOD PRESSURE: 86 MMHG | HEART RATE: 74 BPM | OXYGEN SATURATION: 98 % | DIASTOLIC BLOOD PRESSURE: 60 MMHG

## 2022-06-30 DIAGNOSIS — R92.2 DENSE BREAST TISSUE ON MAMMOGRAM: ICD-10-CM

## 2022-06-30 DIAGNOSIS — Z12.31 VISIT FOR SCREENING MAMMOGRAM: ICD-10-CM

## 2022-06-30 DIAGNOSIS — C50.911 DUCTAL CARCINOMA OF RIGHT BREAST (HCC): Primary | ICD-10-CM

## 2022-06-30 PROCEDURE — 99213 OFFICE O/P EST LOW 20 MIN: CPT | Performed by: SURGERY

## 2022-06-30 PROCEDURE — 77063 BREAST TOMOSYNTHESIS BI: CPT

## 2022-06-30 ASSESSMENT — ENCOUNTER SYMPTOMS
SHORTNESS OF BREATH: 0
NAUSEA: 0
SORE THROAT: 0
BLOOD IN STOOL: 0
ABDOMINAL PAIN: 0
COUGH: 0
WHEEZING: 0
COLOR CHANGE: 0
VOMITING: 0

## 2022-06-30 NOTE — PROGRESS NOTES
Marco A Ruth MD   General Surgery  Follow up  Patient Evaluation in Office  Pt Name: Erika Hope  Date of Birth 1965   Today's Date: 6/30/2022  Medical Record Number: [id number]  Referring Provider: No ref. provider found  Primary Care Provider: ANNA Hargrove CNP  Chief Complaint:  Chief Complaint   Patient presents with    6 Month Follow-Up     Ductal carcinoma of right breast-saw oncology on 2/15/2022-radiation completed-mammogram scheduled 6/30/2022     Stage 0 high-grade ER negative, CA negative  ASSESSMENT      1. Ductal carcinoma of right breast ([address]    2. Dense breast tissue on mammogram         PLANS     1. Clinical breast examination  Today isbenign. 2.  Breast cancer survivorship plan reviewed. 3.  Follow-up surgical clinic in 6 months, for clinical breast examination. 4.  Follow-up radiation oncology as instructed. 5.  Bilateral mammography reviewed performed today. This breast no interval change. 6.  Encourage self breast examinations call for any changes or symptoms. 7.  With extremely dense breast tissue discussed alternating mammograms and MRIs. Will discuss at next visit. SUBJECTIVE   Of present illness:  Tony Draper is a 65y.o. year old female who is presenting today in the office for follow-up evaluation of  high-grade ductal carcinoma in situ of the upper outer quadrant of the right breast it was ER/CA negative. Tony Draper denied any breast symptoms and presented for screening mammography. She has very, extremely dense breast which lowers the sensitivity of her mammography. There was a possible cluster of calcifications in the upper outer breast posterior depth. On diagnostic imaging there is a 1 cm cluster of pleomorphic calcifications in the posterior depth. There is a second cluster just inferior and posterior to the first cluster. She underwent stereotactic core biopsy which revealed a high-grade ductal carcinoma in situ which was ER and CA negative. Patient had not had previous breast biopsies. Her sister was diagnosed with triple negative breast cancer at age 40. Her sister underwent genetic testing and no deleterious mutation was identified. She was [facility] at [facility]. Bianca Georges opted for breast conservation therapy and underwent a right breast lumpectomy. Was high-grade cribriform and solid and comedo types. It was 6 mm in greatest dimension. Margins were focally positive medially and she underwent reexcision of the medial margin which was negative for malignancy. She saw Dr. Keyanna Anders of radiation oncology and underwent a course of external beam radiation to the right breast with 16 fractions given total of 4256 cGy. She completed therapy 2021. Interval history:   She denies any new medical issues she has no significant residual radiation changes in the right breast she has good range of motion, and no edema of the breast.  Bilateral mammography reviewed performed today. Bilateral dense breast tissue. Images reviewed by myself. No suspicious findings. I would consider the patient a candidate for yearly screening mammography alternating with mammogram.  She denies any new health issues or new cancer diagnoses in her family. She denies any significant breast pain or nipple discharge. Menarche age 16. . She had her children at age 33 and 28. She breast-fed both children. She took oral contraceptive medications for 15 years. Menopause age 47 no history of estrogen replacement therapy. Sister 40 history of breast cancer. Father history of prostate and lung cancer. Paternal grandmother history of uterine cancer paternal cousin history of ovarian cancer at a very young age.     Past Medical History  Past Medical History:   Diagnosis Date    Breast cancer ([facility] 2021    right    COVID-19 2020    History of therapeutic radiation     external beam radiation       Past Surgical History  Past Surgical History:   Procedure Laterality Date    BREAST LUMPECTOMY Right 8/11/2021    RIGHT BREAST LUMPECTOMY, PREOP MAG SEED PLACEMENT performed by Shelby Haddad MD at [facility] Right 8/17/2021    RE-EXCISION RIGHT BREAST MARGINS performed by Shelby Haddad MD at [address] [place] [address]    Dr Domenic Perry  04/21/2017    Dr. Meredith Gill [facility] STEROTACTIC LOC BREAST BIOPSY RIGHT Right 07/12/2021    [facility] STEROTACTIC LOC BREAST BIOPSY RIGHT 7/12/2021 Marciano Brady MD [address] [place] [address] NEEDLE LOC OF RIGHT BREAST Right 8/9/2021    US GUIDED NEEDLE LOC OF RIGHT BREAST 8/9/2021 Valerie Grey MD [facility]       Medications  No current outpatient medications on file. No current facility-administered medications for this visit.      Allergies   No Known Allergies    Family History  Family History   Problem Relation Age of Onset    Heart Disease Mother         with stents    Prostate Cancer Father         in his 63's   Verl Raw Lung Cancer Father     Breast Cancer Sister 40        bilat    No Known Problems Sister     No Known Problems Sister     No Known Problems Sister     No Known Problems Sister     Heart Disease Maternal Grandmother     Alzheimer's Disease Maternal Grandfather     Uterine Cancer Paternal Grandmother         >50    No Known Problems Paternal Grandfather     Cancer Paternal Cousin 25       SocialHistory  Social History     Socioeconomic History    Marital status:      Spouse name: Enio Villalobos Number of children: 2    Years of education: Not on file    Highest education level: Not on file   Occupational History    Not on file   Tobacco Use    Smoking status: Never Smoker    Smokeless tobacco: Never Used   Vaping Use    Vaping Use: Never used   Substance and Sexual Activity    Alcohol use: Yes     Comment: occasionally    Drug use: Never    Sexual activity: Not on file   Other Topics Concern    Not on file   Social History Narrative    Not on file     Social Determinants of Health     Financial Resource Strain:     Difficulty of Paying Living Expenses: Not on file   Food Insecurity:     Worried About Running Out of Food in the Last Year: Not on file    Julia of Food in the Last Year: Not on file   Transportation Needs:     Lack of Transportation (Medical): Not on file    Lack of Transportation (Non-Medical): Not on file   Physical Activity:     Days of Exercise per Week: Not on file    Minutes of Exercise per Session: Not on file   Stress:     Feeling of Stress : Not on file   Social Connections:     Frequency of Communication with Friends and Family: Not on file    Frequency of Social Gatherings with Friends and Family: Not on file    Attends Holiness Services: Not on file    Active Member of L2 Group or Organizations: Not on file    Attends Club or Organization Meetings: Not on file    Marital Status: Not on file   Intimate Partner Violence:     Fear of Current or Ex-Partner: Not on file    Emotionally Abused: Not on file    Physically Abused: Not on file    Sexually Abused: Not on file   Housing Stability:     Unable to Pay for Housing in the Last Year: Not on file    Number of [place] in the Last Year: Not on file    Unstable Housing in the Last Year: Not on file           Review of Systems  Review of Systems   Constitutional: Negative for activity change, appetite change, chills, fatigue, fever and unexpected weight change. HENT: Negative for congestion, sore throat and trouble swallowing. Eyes: Negative for redness and visual disturbance. Respiratory: Negative for cough, shortness of breath and wheezing. Cardiovascular: Negative for chest pain and palpitations. Gastrointestinal: Negative for abdominal pain, blood in stool, nausea and vomiting. Endocrine: Negative for cold intolerance, heat intolerance and polydipsia.    Genitourinary: Negative for dysuria, flank pain, hematuria and vaginal bleeding. Musculoskeletal: Negative for arthralgias, gait problem, joint swelling and myalgias. Skin: Negative for color change and rash. Allergic/Immunologic: Negative for immunocompromised state. Neurological: Negative for dizziness, tremors, weakness and headaches. Hematological: Does not bruise/bleed easily. Psychiatric/Behavioral: Negative for behavioral problems and confusion. OBJECTIVE     BP 86/60 (Site: Left Upper Arm, Position: Sitting, Cuff Size: Medium Adult)   Pulse 74   Temp 97.3 °F (36.3 °C) (Temporal)   Resp 16   Ht 5' 1\" (1.549 m)   Wt 117 lb 8 oz (53.3 kg)   SpO2 98%   BMI 22.20 kg/m²      Physical Exam  Vitals reviewed. Constitutional:       General: She is not in acute distress. Appearance: Normal appearance. She is well-developed. She is not ill-appearing or diaphoretic. HENT:      Head: Normocephalic and atraumatic. Nose: No congestion. Eyes:      General: No scleral icterus. Extraocular Movements: Extraocular movements intact. Neck:      Vascular: No JVD. Trachea: No tracheal deviation. Cardiovascular:      Rate and Rhythm: Normal rate. Heart sounds: Normal heart sounds. Pulmonary:      Effort: Pulmonary effort is normal. No respiratory distress. Breath sounds: Normal breath sounds. No wheezing. Chest:      Chest wall: No tenderness. Breasts:      Right: No swelling, bleeding, inverted nipple, mass, nipple discharge, skin change, tenderness, axillary adenopathy or supraclavicular adenopathy. Left: No swelling, bleeding, inverted nipple, mass, nipple discharge, skin change, tenderness, axillary adenopathy or supraclavicular adenopathy. Abdominal:      General: There is no distension. Palpations: There is no mass. Tenderness: There is no abdominal tenderness. Musculoskeletal:         General: No deformity. Cervical back: Neck supple. No rigidity or tenderness. Right lower leg: No edema. Left lower leg: No edema. Lymphadenopathy:      Cervical: No cervical adenopathy. Right cervical: No superficial, deep or posterior cervical adenopathy. Left cervical: No superficial, deep or posterior cervical adenopathy. Upper Body:      Right upper body: No supraclavicular, axillary or pectoral adenopathy. Left upper body: No supraclavicular, axillary or pectoral adenopathy. Skin:     General: Skin is warm and dry. Coloration: Skin is not jaundiced or pale. Findings: No rash. Neurological:      General: No focal deficit present. Mental Status: She is alert and oriented to person, place, and time. Cranial Nerves: No cranial nerve deficit. Psychiatric:         Mood and Affect: Mood normal.         Behavior: Behavior normal.         Thought Content: Thought content normal.         Lab Results   Component Value Date    WBC 5.1 07/22/2021    HGB 14.8 07/22/2021    HCT 46.2 07/22/2021     07/22/2021    CHOL 162 02/10/2021    TRIG 47 02/10/2021    HDL 94 02/10/2021    ALT 14 02/10/2021    AST 18 02/10/2021     02/10/2021    K 3.9 02/10/2021     02/10/2021    CREATININE 0.7 02/10/2021    BUN 16 02/10/2021    CO2 27 02/10/2021    TSH 2.490 02/10/2021          LOCATION: [place]       PROCEDURE: [facility] HAI DIGITAL SCREEN SELF REFERRAL W OR WO CAD BILATERAL       CLINICAL INFORMATION: Visit for screening mammogram. Tomosynthesis.       CLINICAL:     Self-referred screening mammogram   PATIENT MEDICAL HISTORY:  Medical history includes breast cancer. FAMILY HISTORY:   Family medical history includes breast cancer in sister. RISK VALUES: Miguel Angeler-Phylliszick 10yr.: na%, Miguel Angeler-Cecile life:   na%       COMPARISON: 6/30/2021, 6/26/2020, 5/10/2019 and 5/7/2018.       TECHNIQUE: Bilateral CC and MLO views of the breasts were obtained.  3D tomosynthesis was utilized.   CAD was utilized.       BREAST COMPOSITION: The tissue of the breast(s) is extremely dense, which lowers the sensitivity of mammography.       FINDINGS: Miko Henderson are postoperative changes in the right breast. No significant masses, calcifications, or other findings are seen in the breast(s).       There has been no significant interval change.               Impression   No mammographic evidence of malignancy. A 1 year screening mammogram is recommended.       A result letter will be sent to the patient. Sabrina Escalante will also receive a reminder 1 month prior to her next mammogram.       .           BI-RADS CATEGORY 2: BENIGN FINDINGS.       Management Recommendation: Routine annual mammography.               **This report has been created using voice recognition software. It may contain minor errors which are inherent in voice recognition technology. **       Final report electronically signed by Dr. Leydi Cook on 6/30/2022 3:35 PM

## 2022-07-04 ASSESSMENT — ENCOUNTER SYMPTOMS
EYE REDNESS: 0
TROUBLE SWALLOWING: 0

## 2022-07-13 ENCOUNTER — TELEPHONE (OUTPATIENT)
Dept: ONCOLOGY | Age: 57
End: 2022-07-13

## 2022-07-13 NOTE — TELEPHONE ENCOUNTER
Pt called in wanting to see if she couls come in any sooner for her appt day. There was a 820 appt slot open.  Pt oked that time

## 2022-07-26 ENCOUNTER — TELEPHONE (OUTPATIENT)
Dept: ONCOLOGY | Age: 57
End: 2022-07-26

## 2022-08-17 ENCOUNTER — OFFICE VISIT (OUTPATIENT)
Dept: ONCOLOGY | Age: 57
End: 2022-08-17
Payer: COMMERCIAL

## 2022-08-17 ENCOUNTER — HOSPITAL ENCOUNTER (OUTPATIENT)
Dept: INFUSION THERAPY | Age: 57
Discharge: HOME | End: 2022-08-17
Payer: COMMERCIAL

## 2022-08-17 VITALS
SYSTOLIC BLOOD PRESSURE: 112 MMHG | HEART RATE: 65 BPM | DIASTOLIC BLOOD PRESSURE: 60 MMHG | OXYGEN SATURATION: 98 % | TEMPERATURE: 98.5 F | RESPIRATION RATE: 16 BRPM

## 2022-08-17 VITALS
BODY MASS INDEX: 23 KG/M2 | HEART RATE: 65 BPM | HEIGHT: 61 IN | WEIGHT: 121.8 LBS | RESPIRATION RATE: 16 BRPM | OXYGEN SATURATION: 98 % | DIASTOLIC BLOOD PRESSURE: 60 MMHG | SYSTOLIC BLOOD PRESSURE: 112 MMHG | TEMPERATURE: 98.5 F

## 2022-08-17 DIAGNOSIS — Z12.39 ENCOUNTER FOR OTHER SCREENING FOR MALIGNANT NEOPLASM OF BREAST: ICD-10-CM

## 2022-08-17 DIAGNOSIS — D05.11 DUCTAL CARCINOMA IN SITU (DCIS) OF RIGHT BREAST: Primary | ICD-10-CM

## 2022-08-17 PROCEDURE — 99214 OFFICE O/P EST MOD 30 MIN: CPT | Performed by: NURSE PRACTITIONER

## 2022-08-17 PROCEDURE — 99211 OFF/OP EST MAY X REQ PHY/QHP: CPT

## 2022-08-17 NOTE — PROGRESS NOTES
[facility], [address] [facility]  [address] [address]  Dept: [phone]  Dept Fax: [phone]: [phone]      Visit Date:8/17/2022     Eugenio Escobedo is a 65 y.o. female who presents today for:   Chief Complaint   Patient presents with    Follow-up     Ductal carcinoma in situ (DCIS) of right breast        HPI:   Eugenio Escobedo is a 65 y.o. female who follows in our office with history of breast cancer. HPI per last note in our office on 2/15/2022:  Ramona Dietz denied any breast symptoms and presented for screening mammography. She has very, extremely dense breast which lowers the sensitivity of her mammography. There was a possible cluster of calcifications in the upper outer breast posterior depth. On diagnostic imaging there is a 1 cm cluster of pleomorphic calcifications in the posterior depth. There is a second cluster just inferior and posterior to the first cluster. She underwent stereotactic core biopsy which revealed a high-grade ductal carcinoma in situ which was ER and OH negative. Subsequently, she met with the surgeon Dr. Luma Garcia. After discussing her surgical treatment options she decided to proceed with right breast lumpectomy. She had surgery on August 11, 2021. Final pathology report showed  Right breast, lumpectomy:    Ductal carcinoma in situ, high nuclear grade, cribriform, solid, and   comedo types. Carcinoma in situ is >=6 mm in greatest dimension and present       within 7 of 14 blocks. Negative for invasive carcinoma. DCIS is present at the medial margin (unifocal, less than 1 mm). DCIS is 2 mm to the anterior margin. Changes consistent with previous biopsy site. Pseudoangiomatous stromal hyperplasia. pTis     Estrogen Receptor (ER)     Negative   Progesterone Receptor (PgR)    Negative     Due to positive for medial margin she underwent reexcision on August 17, 2021. Pathology report showed:   FINAL DIAGNOSIS:   Right breast, new medial margin, reexcision:    Changes consistent with prior procedure. Glandular microcalcifications. Negative for malignancy. Menarche age 16. . She had her children at age 33 and 28. She breast-fed both children. She took oral contraceptive medications for 15 years. Menopause age 47 no history of estrogen replacement therapy. Sister 40 history of breast cancer. Father history of prostate and lung cancer. Paternal grandmother history of uterine cancer paternal cousin history of ovarian cancer at a very young age         Interval History 2022:   The patient presents to the office today for follow up and evaluation of history of breast cancer. The patient reports she feels well today. She denies fever/chills, recurrent infections, headaches, dizziness, cough, SOB, CP, heart palpitations, bowel/bladder changes, s/s bleeding, unintentional weight loss, drenching night sweats. She denies changes to her breasts, no palpable lumps/masses or nipple inversion/discharge. No edema to chest wall/UE. Last mammogram done in 2022 with benign findings. PMH, SH, and FH:  I reviewed the patient's medication and allergy lists as noted on the electronic medical record. The PMH, SH, and FH were also reviewed as noted on the EMR.         Past Medical History:   Diagnosis Date    Breast cancer ([address] 2021    right    COVID-19 2020    History of therapeutic radiation     external beam radiation      Past Surgical History:   Procedure Laterality Date    BREAST LUMPECTOMY Right 2021    RIGHT BREAST LUMPECTOMY, PREOP MAG SEED PLACEMENT performed by Tamika Yin MD at [facility] Right 2021    RE-EXCISION RIGHT BREAST MARGINS performed by Tamika Yin MD at [address]      Dr Loan Pittman  2017    Dr. Tha Bianchi    [facility] STEROTACTIC LOC BREAST BIOPSY RIGHT Right 2021    [facility] STEROTACTIC LOC BREAST BIOPSY RIGHT 7/12/2021 [place] [address] NEEDLE LOC OF RIGHT BREAST Right 8/9/2021    US GUIDED NEEDLE LOC OF RIGHT BREAST 8/9/2021 Romina Lau MD [facility]      Family History   Problem Relation Age of Onset    Heart Disease Mother         with stents    Prostate Cancer Father         in his 63's    Lung Cancer Father     Breast Cancer Sister 40        bilat    No Known Problems Sister     No Known Problems Sister     No Known Problems Sister     No Known Problems Sister     Heart Disease Maternal Grandmother     Alzheimer's Disease Maternal Grandfather     Uterine Cancer Paternal Grandmother         >50    No Known Problems Paternal Grandfather     Cancer Paternal Cousin 25      Social History     Tobacco Use    Smoking status: Never    Smokeless tobacco: Never   Substance Use Topics    Alcohol use: Yes     Comment: occasionally      No current outpatient medications on file. No current facility-administered medications for this visit. No Known Allergies       Review of Systems:   Review of Systems   Pertinent review of systems noted in HPI, all other ROS negative. Objective:   Physical Exam   /60 (Site: Left Upper Arm, Position: Sitting, Cuff Size: Medium Adult)   Pulse 65   Temp 98.5 °F (36.9 °C) (Oral)   Resp 16   Ht 5' 1\" (1.549 m)   Wt 121 lb 12.8 oz (55.2 kg)   SpO2 98%   BMI 23.01 kg/m²    General appearance: No apparent distress, calm and cooperative. HEENT: Pupils equal, round, and reactive to light. Conjunctivae/corneas clear. Oral mucosa moist  Neck: Supple, with full range of motion. Trachea midline. Respiratory:  Normal respiratory effort. Clear to auscultation all lung fields. Cardiovascular:  RRR, S1/S2. Abdomen: Soft, non-tender, non-distended with active BS  Musculoskeletal: No clubbing, cyanosis or edema bilaterally.   She is able to ambulate in office without difficulty  Skin: Skin color, texture, turgor normal.  No visible rashes or lesions. Neurologic:  Neurovascularly intact without any focal sensory/motor deficits. Cranial nerves: II-XII intact, grossly non-focal.  Psychiatric: Alert and oriented x 3, thought content appropriate, normal insight  Capillary Refill: < 3 seconds   Peripheral Pulses: +2 palpable, equal bilaterally       Imaging Studies and Labs:   CBC:   Lab Results   Component Value Date    WBC 5.1 07/22/2021    HGB 14.8 07/22/2021    HCT 46.2 07/22/2021    .0 (H) 07/22/2021     07/22/2021     BMP:   Lab Results   Component Value Date/Time     02/10/2021 07:40 AM    K 3.9 02/10/2021 07:40 AM     02/10/2021 07:40 AM    CO2 27 02/10/2021 07:40 AM    BUN 16 02/10/2021 07:40 AM    CREATININE 0.7 02/10/2021 07:40 AM    GLUCOSE 87 02/10/2021 07:40 AM    CALCIUM 9.8 02/10/2021 07:40 AM      LFT:   Lab Results   Component Value Date    ALT 14 02/10/2021    AST 18 02/10/2021    ALKPHOS 49 02/10/2021    BILITOT 1.3 (H) 02/10/2021         Assessment and Plan:     1. Ductal carcinoma in situ (DCIS) of right breast  Mammogram 7/2/2021 (+) cluster pleomorphic calcifications in UO right breast; second cluster of possible calcifications seen inferior to first cluster. Bx (+) high-grade DCIS, ER/MT (-). S/p right lumpectomy 8/11/2021 with final path (+) DCIS, high nuclear grade, cribriform, solid, comedo types. ER/MT (-). She underwent re-excision d/t positive margins with resultant negative margins. 2. Encounter for other screening for malignant neoplasm of breast  Management of breast cancer survivors who have completed active treatment and have no evidence of disease involve cancer surveillance and lifestyle modifications including pursuing a healthy exercise regimen, minimizing alcohol intake, and refraining from smoking. Survivor follow up includes updated history and reg  ular physical examination.   Radiological imaging to screen for distant recurrence is completed as needed according to patient report of new symptoms that may suggest disease recurrence or metastases. Symptoms suspicious for recurrence or metastases include:    Constitutional symptoms - anorexia, weight loss, malaise, fatigue, insomnia. Bone pain  Pulmonary symptoms - persistent cough or dyspnea (at rest or with exertion)  Neurological symptoms - headache, nausea, vomiting, confusion, weakness, numbness/tingling  Gastrointestinal symptoms - RUQ pain, change in bowel/bladder habits, presence of bloody or tarry stools. No s/s recurrence from today's examination. No palpable lumps/masses. No nipple changes, inversion/discharge. No edema to chest wall/UE. Ok to continue surveillance. Return in about 6 months (around 2/17/2023). All patient questions answered. Pt voiced understanding. Patient agreed with treatment plan. Follow up as directed. Patient instructed to call for questions or concerns. Electronically signed by   ANNA Nolen CNP     I spent a total of 31 minutes on the day of the visit.

## 2022-11-18 ENCOUNTER — HOSPITAL ENCOUNTER (OUTPATIENT)
Dept: RADIATION ONCOLOGY | Age: 57
Discharge: HOME | End: 2022-11-18
Attending: RADIOLOGY
Payer: COMMERCIAL

## 2022-11-18 VITALS
DIASTOLIC BLOOD PRESSURE: 59 MMHG | HEART RATE: 103 BPM | TEMPERATURE: 98.7 F | BODY MASS INDEX: 22.6 KG/M2 | SYSTOLIC BLOOD PRESSURE: 99 MMHG | OXYGEN SATURATION: 100 % | RESPIRATION RATE: 16 BRPM | WEIGHT: 119.6 LBS

## 2022-11-18 PROCEDURE — 99212 OFFICE O/P EST SF 10 MIN: CPT | Performed by: RADIOLOGY

## 2022-11-18 PROCEDURE — 99213 OFFICE O/P EST LOW 20 MIN: CPT | Performed by: RADIOLOGY

## 2022-11-18 ASSESSMENT — ENCOUNTER SYMPTOMS
BACK PAIN: 0
BLOOD IN STOOL: 0
NAUSEA: 0
TROUBLE SWALLOWING: 0
RECTAL PAIN: 0
COUGH: 0
ABDOMINAL PAIN: 0
SHORTNESS OF BREATH: 0
DIARRHEA: 0

## 2022-11-18 NOTE — PROGRESS NOTES
[address] [facility] [address], [address]        JATIN DEBORAH PA[place]ING [place],  [facility]        Michelle Love: [phone]        F: [phone]       [email or web address]            FOLLOW UP NOTE    Date of Service: 2022  Patient ID: Clare Luong   : 1965  MRN: [id number]   Acct Number: [de-identified]       DATE OF SERVICE: 2022   LOCATION: [facility]  PROVIDER: Chandler Lawler MD MS    FOLLOW UP PHYSICIANS: Dr. Jen Howard [facility]) Dr. Dutch Worthington (Surgery)    ASSESSMENT AND PLAN:  Cancer Staging  Ductal carcinoma in situ (DCIS) of right breast  Staging form: Breast, AJCC 8th Edition  - Pathologic stage from 2021: Stage 0 (pTis (DCIS), pN0, cM0, G3, ER-, CA-) - Signed by Bright Abad MD on 2021    - Ms. Luis Miguel Ayon presents today for follow-up for her right breast DCIS  - She appears to be doing well, without clinical evidence of recurrent or metastatic disease  - She notes some \"nipple flattening\" since surgery and radiation, but otherwise denies breast or axillary lumps, nipple discharge or inversion, and any skin changes, swelling, or pain in the arms or breasts, fatigue, unintentional weight loss, fever, early satiety, shortness of breath, cough, chest pain, changes in urination or bowel movements, headaches, vision changes, bone/joint pain, abdominal pain, pain otherwise   - No concerning findings on exam today  - Most recent mammogram on 22 resulted as BI-RADS 2  - The patient continues to follow with medical oncology and with surgery (Dr. Yony Cesar). Dr. Yony Cesar has mentioned to the patient of the possibility of ordering a breast MRI going forward, given the density of her breast tissue. Will defer to Dr. Yony Cesar for continued surveillance imaging, but advised the patient to call our office if she should have any issues obtaining screening imaging  - The patient will return to clinic in 12 months or sooner if clinically indicated.  They have our clinic number to call with any questions or concerns if needed. Thank you for allowing us to be a part of their care. HPI:  Demian Saleh is a 55y.o. year old female who is presenting today in the office for evaluation of newly diagnosed high-grade ductal carcinoma in situ of the upper outer quadrant of the right breast it was ER/WI negative. Demian Saleh denied any breast symptoms and presented for screening mammography. She has very, extremely dense breast which lowers the sensitivity of her mammography. There was a possible cluster of calcifications in the upper outer breast posterior depth. On diagnostic imaging there is a 1 cm cluster of pleomorphic calcifications in the posterior depth. There is a second cluster just inferior and posterior to the first cluster. She underwent stereotactic core biopsy which revealed a high-grade ductal carcinoma in situ which was ER and WI negative. Patient had not had previous breast biopsies. Her sister was diagnosed with triple negative breast cancer at age 40. Her sister underwent genetic testing and no deleterious mutation was identified. She was treated at [facility]. Misbah Cheek has no significant medical comorbidities. Menarche age 16. . She had her children at age 33 and 28. She breast-fed both children. She took oral contraceptive medications for 15 years. Menopause age 47 no history of estrogen replacement therapy. Sister 40 history of breast cancer. Father history of prostate and lung cancer. Paternal grandmother history of uterine cancer paternal cousin history of ovarian cancer at a very young age. Right breast, lumpectomy:   Ductal carcinoma in situ, high nuclear grade, cribriform, solid, and   comedo types. Carcinoma in situ is >=6 mm in greatest dimension and present       within 7 of 14 blocks. Negative for invasive carcinoma. DCIS is present at the medial margin (unifocal, less than 1 mm). DCIS is 2 mm to the anterior margin.        Changes consistent with previous biopsy site. Pseudoangiomatous stromal hyperplasia. pTis      Estrogen Receptor (ER)    Estrogen Receptor (ER) Status    Negative     Progesterone Receptor (PgR)    Progesterone Receptor (PgR) Status    Negative        IMAGIN/30/22 MALIHA SCREEN B/L  No mammographic evidence of malignancy. A 1 year screening mammogram is recommended. A result letter will be sent to the patient. She will also receive a reminder 1 month prior to her next mammogram.       . BI-RADS CATEGORY 2: BENIGN FINDINGS. Radiation Therapy:  Treatment Course Number: 1  Dates: 10/04/21 to 10/25/21     Treatment Site (s) Modality Dose (cGy) Fractions Elapsed Days   Right Whole Breast 3DRT 4256 16 21      Cumulative Dose: 4256 cGy     Radiation therapy delivered under the care of Dr. Kaykay Mendez MD MS ([facility])      INTERVAL HISTORY:  Anastacio Flood is a 65 y.o. female is presenting today for regularly scheduled follow up regarding the above mentioned oncologic history. Review of Systems   Constitutional:  Negative for activity change, appetite change, fatigue and unexpected weight change. HENT:  Negative for ear pain and trouble swallowing. Respiratory:  Negative for cough and shortness of breath. Cardiovascular:  Negative for chest pain and leg swelling. Gastrointestinal:  Negative for abdominal pain, blood in stool, diarrhea, nausea and rectal pain. Genitourinary:  Negative for dysuria, frequency, hematuria and urgency. Musculoskeletal:  Negative for arthralgias, back pain and myalgias. Skin:  Negative for rash and wound. Neurological:  Negative for dizziness, weakness, light-headedness, numbness and headaches. Psychiatric/Behavioral:  Negative for confusion. A complete review of systems was performed and found to be negative except as presented above.     CHAPERONE: Shira Toñito PAJoannC    PAIN: 0/10    LABORATORY STUDIES:  Onc labs: No results found for: PSA, CEA, LDH, AFP    MEDICATIONS:   No current outpatient medications on file. No current facility-administered medications for this encounter. PHYSICAL EXAMINATION:  VITAL SIGNS: BP (!) 99/59   Pulse (!) 103   Temp 98.7 °F (37.1 °C) (Infrared)   Resp 16   Wt 119 lb 9.6 oz (54.3 kg)   SpO2 100%   BMI 22.60 kg/m²     ECO - Asymptomatic (Fully active, able to carry on all pre-disease activities without restriction)    Physical Exam  Constitutional:       General: She is not in acute distress. Appearance: Normal appearance. HENT:      Head: Normocephalic and atraumatic. Pulmonary:      Effort: Pulmonary effort is normal. No respiratory distress. Chest:   Breasts:     Right: No swelling, bleeding, inverted nipple, mass, nipple discharge, skin change or tenderness. Left: No swelling, bleeding, inverted nipple, mass, nipple discharge, skin change or tenderness. Abdominal:      General: Abdomen is flat. Lymphadenopathy:      Upper Body:      Right upper body: No axillary adenopathy. Left upper body: No axillary adenopathy. Neurological:      Mental Status: She is alert and oriented to person, place, and time. Electronically signed by Devendra Buchanan MD MS on 22 at 9:16 AM EST     ATTESTATION: 20 minutes were spent with the patient at today's visit reviewing pertinent information related to their oncologic diagnosis, including any recent labs, imaging, follow ups and plan of care going forward.     CC:Dr. Pillai [facility]) Dr. Mic Genao (Surgery)  ACC:[facility]

## 2023-01-05 ENCOUNTER — OFFICE VISIT (OUTPATIENT)
Dept: SURGERY | Age: 58
End: 2023-01-05
Payer: COMMERCIAL

## 2023-01-05 VITALS
WEIGHT: 120 LBS | HEIGHT: 61 IN | OXYGEN SATURATION: 97 % | HEART RATE: 72 BPM | TEMPERATURE: 97.3 F | SYSTOLIC BLOOD PRESSURE: 99 MMHG | DIASTOLIC BLOOD PRESSURE: 62 MMHG | BODY MASS INDEX: 22.66 KG/M2

## 2023-01-05 DIAGNOSIS — R92.2 DENSE BREAST TISSUE: ICD-10-CM

## 2023-01-05 DIAGNOSIS — C50.911 DUCTAL CARCINOMA OF RIGHT BREAST (HCC): Primary | ICD-10-CM

## 2023-01-05 DIAGNOSIS — Z80.3 FAMILY HISTORY OF BREAST CANCER: ICD-10-CM

## 2023-01-05 PROCEDURE — 99213 OFFICE O/P EST LOW 20 MIN: CPT | Performed by: SURGERY

## 2023-01-05 ASSESSMENT — ENCOUNTER SYMPTOMS
COUGH: 0
ABDOMINAL PAIN: 0
BLOOD IN STOOL: 0
COLOR CHANGE: 0
SORE THROAT: 0
EYE REDNESS: 0
VOMITING: 0
WHEEZING: 0
SHORTNESS OF BREATH: 0
TROUBLE SWALLOWING: 0
NAUSEA: 0

## 2023-01-05 NOTE — PROGRESS NOTES
Allison Echavarria MD   General Surgery  Follow up  Patient Evaluation in Office  Pt Name: Lio Harmon  Date of Birth 1965   Today's Date: 1/5/2023  Medical Record Number: [id number]  Referring Provider: No ref. provider found  Primary Care Provider: ANNA Mckinley CNP  Chief Complaint:  Chief Complaint   Patient presents with    6 Month Follow-Up     Ductal carcinoma of right breast-Mammogram done on  6/30/22-Last office visit 6/30/22     Stage 0 high-grade ER negative, NH negative  ASSESSMENT      1. Ductal carcinoma of right breast ([address]    2. Extremely dense breast tissue     PLANS     1. Clinical breast examination  performed, No suspicious findings. 2.  Breast cancer survivorship plan reviewed. 3.  Follow-up surgical clinic in 6 months, for clinical breast examination. 4.   mammography reviewed. 5.  Schedule patient for bilateral breast MRI. Patient has extremely dense breast tissue and strong family history of breast cancer. 6.  Encourage self breast examinations call for any changes or symptoms. 7. Encouraged regular exercise. SUBJECTIVE   Of present illness:  Yazmin Correa is a 65y.o. 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 She was diagnosed in 2021. Yazmin Correa denied any breast symptoms and presented for screening mammography. She has very, extremely dense breast which lowers the sensitivity of her mammography. There was a possible cluster of calcifications in the upper outer breast posterior depth. On diagnostic imaging there is a 1 cm cluster of pleomorphic calcifications in the posterior depth. There is a second cluster just inferior and posterior to the first cluster. She underwent stereotactic core biopsy which revealed a high-grade ductal carcinoma in situ which was ER and NH negative.   Patient had not had previous breast biopsies. Her sister was diagnosed with triple negative breast cancer at age 40. Her sister underwent genetic testing and no deleterious mutation was identified. She was treated at [facility]. Wolfgang Pathak opted for breast conservation therapy and underwent a right breast lumpectomy. Pathology was high-grade DCIS, cribriform and solid and comedo types. It was 6 mm in greatest dimension. Margins were focally positive medially and she underwent reexcision of the medial margin which was negative for malignancy. She saw Dr. Donis Dunn of radiation oncology and underwent a course of external beam radiation to the right breast with 16 fractions given total of 4256 cGy. She completed therapy 2021. Interval history:    Wolfagng Pathak denies any health issues. Mammography in  no suspicious findings. Clinical breast examination today is benign. She has good range of motion. She denies any new health issues. Is agreeable to follow-up MRI due to the extremely dense nature of her breast tissue. She continues to follow with medical oncology. She denies any new known cancer diagnoses in her family. Menarche age 16. . She had her children at age 33 and 28. She breast-fed both children. She took oral contraceptive medications for 15 years. Menopause age 47 no history of estrogen replacement therapy. Sister 40 history of breast cancer. Father history of prostate and lung cancer. Paternal grandmother history of uterine cancer paternal cousin history of ovarian cancer at a very young age.     Past Medical History  Past Medical History:   Diagnosis Date    Breast cancer ([facility] [address] 2021    right    COVID-19 2020    History of therapeutic radiation     external beam radiation       Past Surgical History  Past Surgical History:   Procedure Laterality Date    BREAST LUMPECTOMY Right 2021    RIGHT BREAST LUMPECTOMY, PREOP MAG SEED PLACEMENT performed by Khurram Oseguera MD at [address] [place] [address] Right 8/17/2021    RE-EXCISION RIGHT BREAST MARGINS performed by Lisa Daly MD at [address] [address]    Dr Lilia Santoro  04/21/2017    Dr. Sisi Kirk    MALIHA STEROTACTIC LOC BREAST BIOPSY RIGHT Right 07/12/2021    MALIHA STEROTACTIC LOC BREAST BIOPSY RIGHT 7/12/2021 [place] [id number] [facility] [address] [address] NEEDLE LOC OF RIGHT BREAST Right 8/9/2021    US GUIDED NEEDLE LOC OF RIGHT BREAST 8/9/2021 Gretchen Marks MD [facility]       Medications  No current outpatient medications on file. No current facility-administered medications for this visit.      Allergies   No Known Allergies    Family History  Family History   Problem Relation Age of Onset    Heart Disease Mother         with stents    Prostate Cancer Father         in his 63's    Lung Cancer Father     Breast Cancer Sister 40        bilat    No Known Problems Sister     No Known Problems Sister     No Known Problems Sister     No Known Problems Sister     Heart Disease Maternal Grandmother     Alzheimer's Disease Maternal Grandfather     Uterine Cancer Paternal Grandmother         >50    No Known Problems Paternal Grandfather     Cancer Paternal Cousin 25       SocialHistory  Social History     Socioeconomic History    Marital status:      Spouse name: Cyndi Young    Number of children: 2    Years of education: Not on file    Highest education level: Not on file   Occupational History    Not on file   Tobacco Use    Smoking status: Never    Smokeless tobacco: Never   Vaping Use    Vaping Use: Never used   Substance and Sexual Activity    Alcohol use: Yes     Comment: occasionally    Drug use: Never    Sexual activity: Not on file   Other Topics Concern    Not on file   Social History Narrative    Not on file     Social Determinants of Health     Financial Resource Strain: Not on file   Food Insecurity: Not on file   Transportation Needs: Not on file   Physical Activity: Not on file   Stress: Not on file   Social Connections: Not on file   Intimate Partner Violence: Not on file   Housing Stability: Not on file           Review of Systems  Review of Systems   Constitutional:  Negative for activity change, appetite change, chills, fatigue and unexpected weight change. HENT:  Negative for congestion, dental problem, rhinorrhea, sore throat and trouble swallowing. Eyes:  Negative for redness, itching and visual disturbance. Respiratory:  Negative for cough, shortness of breath and wheezing. Cardiovascular:  Negative for chest pain and palpitations. Gastrointestinal:  Negative for abdominal distention, abdominal pain, blood in stool, nausea and vomiting. Endocrine: Negative for cold intolerance, heat intolerance and polydipsia. Genitourinary:  Negative for decreased urine volume, dysuria, flank pain, hematuria and vaginal bleeding. Musculoskeletal:  Negative for arthralgias, gait problem, joint swelling and myalgias. Skin:  Negative for color change and rash. Allergic/Immunologic: Negative for immunocompromised state. Neurological:  Negative for dizziness, tremors, weakness and headaches. Hematological:  Does not bruise/bleed easily. Psychiatric/Behavioral:  Negative for behavioral problems, confusion and dysphoric mood. The patient is not nervous/anxious. OBJECTIVE     BP 99/62 (Site: Left Upper Arm, Position: Sitting, Cuff Size: Medium Adult)   Pulse 72   Temp 97.3 °F (36.3 °C) (Temporal)   Ht 5' 1\" (1.549 m)   Wt 120 lb (54.4 kg)   SpO2 97%   BMI 22.67 kg/m²      Physical Exam  Vitals reviewed. Constitutional:       General: She is not in acute distress. Appearance: Normal appearance. She is well-developed. She is not ill-appearing or diaphoretic. HENT:      Head: Normocephalic and atraumatic. Nose: No congestion. Eyes:      General: No scleral icterus. Extraocular Movements: Extraocular movements intact.       Pupils: Pupils are equal, round, and reactive to light. Neck:      Vascular: No JVD. Trachea: No tracheal deviation. Cardiovascular:      Rate and Rhythm: Normal rate and regular rhythm. Heart sounds: Normal heart sounds. No murmur heard. Pulmonary:      Effort: Pulmonary effort is normal. No respiratory distress. Breath sounds: Normal breath sounds. No wheezing. Chest:      Chest wall: No tenderness. Breasts:     Right: No swelling, bleeding, inverted nipple, mass, nipple discharge, skin change or tenderness. Left: No swelling, bleeding, inverted nipple, mass, nipple discharge, skin change or tenderness. Comments: Some retraction  medial aspect of incision  Abdominal:      General: There is no distension. Palpations: There is no mass. Tenderness: There is no abdominal tenderness. Musculoskeletal:         General: No deformity. Cervical back: Neck supple. No rigidity or tenderness. Right lower leg: No edema. Left lower leg: No edema. Lymphadenopathy:      Cervical: No cervical adenopathy. Right cervical: No superficial, deep or posterior cervical adenopathy. Left cervical: No superficial, deep or posterior cervical adenopathy. Upper Body:      Right upper body: No supraclavicular, axillary or pectoral adenopathy. Left upper body: No supraclavicular, axillary or pectoral adenopathy. Skin:     General: Skin is warm and dry. Coloration: Skin is not jaundiced or pale. Findings: No rash. Neurological:      General: No focal deficit present. Mental Status: She is alert and oriented to person, place, and time. Cranial Nerves: No cranial nerve deficit. Psychiatric:         Mood and Affect: Mood normal.         Behavior: Behavior normal.         Thought Content:  Thought content normal.       Lab Results   Component Value Date    WBC 5.1 07/22/2021    HGB 14.8 07/22/2021    HCT 46.2 07/22/2021     07/22/2021    CHOL 162 02/10/2021    TRIG 47 02/10/2021 HDL 94 02/10/2021    ALT 14 02/10/2021    AST 18 02/10/2021     02/10/2021    K 3.9 02/10/2021     02/10/2021    CREATININE 0.7 02/10/2021    BUN 16 02/10/2021    CO2 27 02/10/2021    TSH 2.490 02/10/2021          LOCATION: [place]       PROCEDURE: MALIHA HAI DIGITAL SCREEN SELF REFERRAL W OR WO CAD BILATERAL       CLINICAL INFORMATION: Visit for screening mammogram. Tomosynthesis. CLINICAL:     Self-referred screening mammogram   PATIENT MEDICAL HISTORY:  Medical history includes breast cancer. FAMILY HISTORY:   Family medical history includes breast cancer in sister. RISK VALUES: Tyrer-Cuzick 10yr.: na%, Tyrer-Cuzick life:   na%       COMPARISON: 6/30/2021, 6/26/2020, 5/10/2019 and 5/7/2018. TECHNIQUE: Bilateral CC and MLO views of the breasts were obtained. 3D tomosynthesis was utilized. CAD was utilized. BREAST COMPOSITION: The tissue of the breast(s) is extremely dense, which lowers the sensitivity of mammography. FINDINGS:  There are postoperative changes in the right breast. No significant masses, calcifications, or other findings are seen in the breast(s). There has been no significant interval change. Impression   No mammographic evidence of malignancy. A 1 year screening mammogram is recommended. A result letter will be sent to the patient. She will also receive a reminder 1 month prior to her next mammogram.       . BI-RADS CATEGORY 2: BENIGN FINDINGS. Management Recommendation: Routine annual mammography. **This report has been created using voice recognition software. It may contain minor errors which are inherent in voice recognition technology. **       Final report electronically signed by Dr. Armand Chun on 6/30/2022 3:35 PM

## 2023-01-07 ASSESSMENT — ENCOUNTER SYMPTOMS
EYE ITCHING: 0
ABDOMINAL DISTENTION: 0
RHINORRHEA: 0

## 2023-01-27 ENCOUNTER — HOSPITAL ENCOUNTER (OUTPATIENT)
Dept: MRI IMAGING | Age: 58
Discharge: HOME | End: 2023-01-27
Payer: COMMERCIAL

## 2023-01-27 DIAGNOSIS — C50.911 DUCTAL CARCINOMA OF RIGHT BREAST (HCC): ICD-10-CM

## 2023-01-27 DIAGNOSIS — Z80.3 FAMILY HISTORY OF BREAST CANCER: ICD-10-CM

## 2023-01-27 DIAGNOSIS — R92.2 DENSE BREAST TISSUE: ICD-10-CM

## 2023-01-27 PROCEDURE — 6360000004 HC RX CONTRAST MEDICATION: Performed by: SURGERY

## 2023-01-27 PROCEDURE — C8908 MRI W/O FOL W/CONT, BREAST,: HCPCS

## 2023-01-27 PROCEDURE — A9579 GAD-BASE MR CONTRAST NOS,1ML: HCPCS | Performed by: SURGERY

## 2023-01-27 RX ADMIN — GADOTERIDOL 15 ML: 279.3 INJECTION, SOLUTION INTRAVENOUS at 09:11

## 2023-01-30 ENCOUNTER — TELEPHONE (OUTPATIENT)
Dept: SURGERY | Age: 58
End: 2023-01-30

## 2023-01-30 NOTE — TELEPHONE ENCOUNTER
----- Message from Theresa Gomez MD sent at 1/30/2023 11:23 AM EST -----  Mri nl  ----- Message -----  From: [place] Incoming Radiant Results From [facility]  Sent: 1/30/2023   7:29 AM EST  To: Theresa Gomez MD

## 2023-02-24 ENCOUNTER — OFFICE VISIT (OUTPATIENT)
Dept: ONCOLOGY | Age: 58
End: 2023-02-24

## 2023-02-24 ENCOUNTER — HOSPITAL ENCOUNTER (OUTPATIENT)
Dept: INFUSION THERAPY | Age: 58
Discharge: HOME | End: 2023-02-24
Payer: COMMERCIAL

## 2023-02-24 VITALS
HEART RATE: 87 BPM | SYSTOLIC BLOOD PRESSURE: 89 MMHG | RESPIRATION RATE: 20 BRPM | BODY MASS INDEX: 22.16 KG/M2 | HEIGHT: 61 IN | TEMPERATURE: 97.8 F | WEIGHT: 117.4 LBS | OXYGEN SATURATION: 97 % | DIASTOLIC BLOOD PRESSURE: 57 MMHG

## 2023-02-24 VITALS
BODY MASS INDEX: 22.16 KG/M2 | RESPIRATION RATE: 20 BRPM | HEART RATE: 87 BPM | SYSTOLIC BLOOD PRESSURE: 89 MMHG | DIASTOLIC BLOOD PRESSURE: 57 MMHG | HEIGHT: 61 IN | OXYGEN SATURATION: 97 % | TEMPERATURE: 97.8 F | WEIGHT: 117.4 LBS

## 2023-02-24 DIAGNOSIS — Z12.31 ENCOUNTER FOR SCREENING MAMMOGRAM FOR MALIGNANT NEOPLASM OF BREAST: Primary | ICD-10-CM

## 2023-02-24 DIAGNOSIS — D05.11 DUCTAL CARCINOMA IN SITU (DCIS) OF RIGHT BREAST: ICD-10-CM

## 2023-02-24 PROCEDURE — 99211 OFF/OP EST MAY X REQ PHY/QHP: CPT

## 2023-02-24 NOTE — PROGRESS NOTES
[facility] [address] [facility]  [address], [address]  [facility] [address]  Dept: [phone]  Dept Fax: [phone]: [phone]      Visit Date:2/24/2023     Veronica Salazar is a 65 y.o. female who presents today for:   Chief Complaint   Patient presents with    Follow-up     Ductal carcinoma in situ (DCIS) of right breast        HPI:   Veronica Salazar is a 65 y.o. female who follows in our office with history of breast cancer. HPI per previous note in our office:  Immanuel Crowley denied any breast symptoms and presented for screening mammography. She has very, extremely dense breast which lowers the sensitivity of her mammography. There was a possible cluster of calcifications in the upper outer breast posterior depth. On diagnostic imaging there is a 1 cm cluster of pleomorphic calcifications in the posterior depth. There is a second cluster just inferior and posterior to the first cluster. She underwent stereotactic core biopsy which revealed a high-grade ductal carcinoma in situ which was ER and IN negative. Subsequently, she met with the surgeon Dr. Jerrell Hargrove. After discussing her surgical treatment options she decided to proceed with right breast lumpectomy. She had surgery on August 11, 2021. Final pathology report showed  Right breast, lumpectomy:    Ductal carcinoma in situ, high nuclear grade, cribriform, solid, and   comedo types. Carcinoma in situ is >=6 mm in greatest dimension and present       within 7 of 14 blocks. Negative for invasive carcinoma. DCIS is present at the medial margin (unifocal, less than 1 mm). DCIS is 2 mm to the anterior margin. Changes consistent with previous biopsy site. Pseudoangiomatous stromal hyperplasia. pTis     Estrogen Receptor (ER)     Negative   Progesterone Receptor (PgR)    Negative     Due to positive for medial margin she underwent reexcision on August 17, 2021. Pathology report showed:   FINAL DIAGNOSIS:   Right breast, new medial margin, reexcision:    Changes consistent with prior procedure. Glandular microcalcifications. Negative for malignancy. Menarche age 16. . She had her children at age 33 and 28. She breast-fed both children. She took oral contraceptive medications for 15 years. Menopause age 47 no history of estrogen replacement therapy. Sister 40 history of breast cancer. Father history of prostate and lung cancer. Paternal grandmother history of uterine cancer paternal cousin history of ovarian cancer at a very young age         Interval History 2023:   The patient presents to the office today for follow up and evaluation of history of breast cancer. The patient reports she feels well today. She denies fever/chills, recurrent infections, headaches, dizziness, cough, SOB, CP, heart palpitations, abdominal pain, N/V/C/D, peripheral edema, peripheral neuropathy, s/s bleeding. She denies changes to her breasts, no palpable lumps/masses, nipple changes or edema to chest wall/UE. She had mammogram done in 2022 with benign findings. MRI breasts 2023 (-). PMH, SH, and FH:  I reviewed the patient's medication and allergy lists as noted on the electronic medical record. The PMH, SH, and FH were also reviewed as noted on the EMR.         Past Medical History:   Diagnosis Date    Breast cancer ([address] 2021    right    COVID-19 2020    History of therapeutic radiation     external beam radiation      Past Surgical History:   Procedure Laterality Date    BREAST LUMPECTOMY Right 2021    RIGHT BREAST LUMPECTOMY, PREOP MAG SEED PLACEMENT performed by Audra Dumont MD at [facility] Right 2021    RE-EXCISION RIGHT BREAST MARGINS performed by Audra Dumont MD at [facility]      Dr Zoltan Reardon  2017    Dr. Winter Vieyra    [facility] STEROTACTIC LOC BREAST BIOPSY RIGHT Right 2021 [facility] STEROTACTIC LOC BREAST BIOPSY RIGHT 7/12/2021 [place] [address] [facility] [address] [address] NEEDLE LOC OF RIGHT BREAST Right 8/9/2021    US GUIDED NEEDLE LOC OF RIGHT BREAST 8/9/2021 Nirav Kearney MD [facility]      Family History   Problem Relation Age of Onset    Heart Disease Mother         with stents    Prostate Cancer Father         in his 63's    Lung Cancer Father     Breast Cancer Sister 40        bilat    No Known Problems Sister     No Known Problems Sister     No Known Problems Sister     No Known Problems Sister     Heart Disease Maternal Grandmother     Alzheimer's Disease Maternal Grandfather     Uterine Cancer Paternal Grandmother         >50    No Known Problems Paternal Grandfather     Cancer Paternal Cousin 25      Social History     Tobacco Use    Smoking status: Never    Smokeless tobacco: Never   Substance Use Topics    Alcohol use: Yes     Comment: occasionally      No current outpatient medications on file. No current facility-administered medications for this visit. No Known Allergies       Review of Systems:   Review of Systems   Pertinent review of systems noted in HPI, all other ROS negative. Objective:   Physical Exam   BP (!) 89/57 (Site: Left Upper Arm, Position: Sitting, Cuff Size: Medium Adult)   Pulse 87   Temp 97.8 °F (36.6 °C) (Oral)   Resp 20   Ht 5' 1\" (1.549 m)   Wt 117 lb 6.4 oz (53.3 kg)   SpO2 97%   BMI 22.18 kg/m²    General appearance: No apparent distress, calm and cooperative. HEENT: Pupils equal, round, and reactive to light. Conjunctivae/corneas clear. Oral mucosa moist  Neck: Supple, with full range of motion. Trachea midline. Respiratory:  Normal respiratory effort. Clear to auscultation all lung fields. Cardiovascular: RRR, S1/S2  Abdomen: Soft, non-tender, non-distended with active BS  Musculoskeletal: No clubbing, cyanosis or edema bilaterally.   She is able to ambulate in office  Skin: Skin color, texture, turgor normal.  No visible rashes or lesions. Neurologic:  Neurovascularly intact without any focal sensory/motor deficits. Cranial nerves: II-XII intact, grossly non-focal.  Psychiatric: Alert and oriented x 3, thought content appropriate, normal insight  Capillary Refill: < 3 seconds   Peripheral Pulses: +2 palpable      Imaging Studies and Labs:   CBC:   Lab Results   Component Value Date    WBC 5.1 07/22/2021    HGB 14.8 07/22/2021    HCT 46.2 07/22/2021    .0 (H) 07/22/2021     07/22/2021     BMP:   Lab Results   Component Value Date/Time     02/10/2021 07:40 AM    K 3.9 02/10/2021 07:40 AM     02/10/2021 07:40 AM    CO2 27 02/10/2021 07:40 AM    BUN 16 02/10/2021 07:40 AM    CREATININE 0.7 02/10/2021 07:40 AM    GLUCOSE 87 02/10/2021 07:40 AM    CALCIUM 9.8 02/10/2021 07:40 AM      LFT:   Lab Results   Component Value Date    ALT 14 02/10/2021    AST 18 02/10/2021    ALKPHOS 49 02/10/2021    BILITOT 1.3 (H) 02/10/2021         Assessment and Plan:     1. Ductal carcinoma in situ (DCIS) of right breast  Mammogram 7/2/2021 (+) cluster pleomorphic calcifications in UO right breast; second cluster of possible calcifications seen inferior to first cluster. Bx (+) high-grade DCIS, ER/WV (-). S/p right lumpectomy 8/11/2021 with final path (+) DCIS, high nuclear grade, cribriform, solid, comedo types. ER/WV (-). She underwent re-excision d/t positive margins with resultant negative margins. 2. Encounter for screening mammogram for malignant neoplasm of breast  Management of breast cancer survivors who have completed active treatment and have no evidence of disease involve cancer surveillance and lifestyle modifications including pursuing a healthy exercise regimen, minimizing alcohol intake, and refraining from smoking. Survivor follow up includes updated history and regular physical examination.   Radiological imaging to screen for distant recurrence is completed as needed according to patient report of new symptoms that may suggest disease recurrence or metastases. Symptoms suspicious for recurrence or metastases include:    Constitutional symptoms - anorexia, weight loss, malaise, fatigue, insomnia. Bone pain  Pulmonary symptoms - persistent cough or dyspnea (at rest or with exertion)  Neurological symptoms - headache, nausea, vomiting, confusion, weakness, numbness/tingling  Gastrointestinal symptoms - RUQ pain, change in bowel/bladder habits, presence of bloody or tarry stools. No s/s recurrence from today's examination. No palpable lumps/masses, nipple changes or edema to chest wall/UE. Continue surveillance. Mammogram due July 2023.      - [facility] DIGITAL SCREEN BILATERAL; Future    Return in about 6 months (around 8/24/2023). All patient questions answered. Pt voiced understanding. Patient agreed with treatment plan. Follow up as directed. Patient instructed to call for questions or concerns. Electronically signed by   ANNA Allred CNP     I spent a total of 32 minutes on the day of the visit.

## 2023-07-12 ENCOUNTER — HOSPITAL ENCOUNTER (OUTPATIENT)
Dept: WOMENS IMAGING | Age: 58
Discharge: HOME | End: 2023-07-12
Payer: COMMERCIAL

## 2023-07-12 DIAGNOSIS — Z12.31 ENCOUNTER FOR SCREENING MAMMOGRAM FOR MALIGNANT NEOPLASM OF BREAST: ICD-10-CM

## 2023-07-12 PROCEDURE — 77063 BREAST TOMOSYNTHESIS BI: CPT

## 2023-07-20 ENCOUNTER — OFFICE VISIT (OUTPATIENT)
Dept: SURGERY | Age: 58
End: 2023-07-20
Payer: COMMERCIAL

## 2023-07-20 VITALS
TEMPERATURE: 97.9 F | BODY MASS INDEX: 22.28 KG/M2 | HEIGHT: 61 IN | HEART RATE: 50 BPM | WEIGHT: 118 LBS | SYSTOLIC BLOOD PRESSURE: 100 MMHG | OXYGEN SATURATION: 99 % | RESPIRATION RATE: 18 BRPM | DIASTOLIC BLOOD PRESSURE: 58 MMHG

## 2023-07-20 DIAGNOSIS — R92.2 DENSE BREAST TISSUE: ICD-10-CM

## 2023-07-20 DIAGNOSIS — C50.911 DUCTAL CARCINOMA OF RIGHT BREAST (HCC): Primary | ICD-10-CM

## 2023-07-20 DIAGNOSIS — Z80.3 FAMILY HISTORY OF BREAST CANCER: ICD-10-CM

## 2023-07-20 PROCEDURE — 99214 OFFICE O/P EST MOD 30 MIN: CPT | Performed by: SURGERY

## 2023-07-20 ASSESSMENT — ENCOUNTER SYMPTOMS
SORE THROAT: 0
TROUBLE SWALLOWING: 0
VOMITING: 0
COLOR CHANGE: 0
BLOOD IN STOOL: 0
RHINORRHEA: 0
SHORTNESS OF BREATH: 0
COUGH: 0
WHEEZING: 0
NAUSEA: 0
EYE REDNESS: 0
ABDOMINAL PAIN: 0
EYE ITCHING: 0
ABDOMINAL DISTENTION: 0

## 2023-07-20 NOTE — PROGRESS NOTES
RIGHT BREAST LUMPECTOMY, PREOP MAG SEED PLACEMENT performed by Mateo Duran MD at [address] Right 8/17/2021    RE-EXCISION RIGHT BREAST MARGINS performed by Mateo Duran MD at [address] [facility]  2008    Dr Stacey France  04/21/2017    Dr. Rocio Juarez    [facility] STEROTACTIC LOC BREAST BIOPSY RIGHT Right 07/12/2021    [facility] STEROTACTIC LOC BREAST BIOPSY RIGHT 7/12/2021 Claudette Bill  [facility] NEEDLE LOC OF RIGHT BREAST Right 8/9/2021    US GUIDED NEEDLE LOC OF RIGHT BREAST 8/9/2021 Frannie Amaya MD [facility]       Medications  No current outpatient medications on file. No current facility-administered medications for this visit.      Allergies   No Known Allergies    Family History  Family History   Problem Relation Age of Onset    Heart Disease Mother         with stents    Prostate Cancer Father         in his 63's    Lung Cancer Father     Breast Cancer Sister 40        bilat    No Known Problems Sister     No Known Problems Sister     No Known Problems Sister     No Known Problems Sister     Heart Disease Maternal Grandmother     Alzheimer's Disease Maternal Grandfather     Uterine Cancer Paternal Grandmother         >50    No Known Problems Paternal Grandfather     Cancer Paternal Cousin 25       SocialHistory  Social History     Socioeconomic History    Marital status:      Spouse name: Jessika Nur    Number of children: 2    Years of education: Not on file    Highest education level: Not on file   Occupational History    Not on file   Tobacco Use    Smoking status: Never    Smokeless tobacco: Never   Vaping Use    Vaping Use: Never used   Substance and Sexual Activity    Alcohol use: Yes     Comment: occasionally    Drug use: Never    Sexual activity: Not on file   Other Topics Concern    Not on file   Social History Narrative    Not on file     Social Determinants of Health     Financial Resource Strain: Not on

## 2023-07-22 PROBLEM — R92.30 DENSE BREAST TISSUE: Status: ACTIVE | Noted: 2023-07-22

## 2023-07-22 PROBLEM — Z80.3 FAMILY HISTORY OF BREAST CANCER: Status: ACTIVE | Noted: 2023-07-22

## 2023-07-22 PROBLEM — R92.2 DENSE BREAST TISSUE: Status: ACTIVE | Noted: 2023-07-22

## 2023-09-02 NOTE — PROGRESS NOTES
normal.  No visible rashes or lesions. Neurologic:  Neurovascularly intact without any focal sensory/motor deficits. Cranial nerves: II-XII intact, grossly non-focal.  Psychiatric: Alert and oriented x 3, thought content appropriate, normal insight  Capillary Refill: < 3 seconds   Peripheral Pulses: +2 palpable      Imaging Studies and Labs:   CBC:   Lab Results   Component Value Date    WBC 5.1 07/22/2021    HGB 14.8 07/22/2021    HCT 46.2 07/22/2021    .0 (H) 07/22/2021     07/22/2021     BMP:   Lab Results   Component Value Date/Time     02/10/2021 07:40 AM    K 3.9 02/10/2021 07:40 AM     02/10/2021 07:40 AM    CO2 27 02/10/2021 07:40 AM    BUN 16 02/10/2021 07:40 AM    CREATININE 0.7 02/10/2021 07:40 AM    GLUCOSE 87 02/10/2021 07:40 AM    CALCIUM 9.8 02/10/2021 07:40 AM      LFT:   Lab Results   Component Value Date    ALT 14 02/10/2021    AST 18 02/10/2021    ALKPHOS 49 02/10/2021    BILITOT 1.3 (H) 02/10/2021         Assessment and Plan:     1. Ductal carcinoma in situ (DCIS) of right breast  Mammogram 7/2/2021 (+) cluster pleomorphic calcifications in UO right breast; second cluster of possible calcifications seen inferior to first cluster. Bx (+) high-grade DCIS, ER/CA (-). S/p right lumpectomy 8/11/2021 with final path (+) DCIS, high nuclear grade, cribriform, solid, comedo types. ER/CA (-). She underwent re-excision d/t positive margins with resultant negative margins. 2. Encounter for screening mammogram for malignant neoplasm of breast  Screening mammogram 7/2023 (-). Recommends MRI breasts yearly d/t dense breasts. Last MRI breasts 1/2023. Pt states Dr. Edwina Grubbs told her she will order at next f/u appnt in her office in January. Will review this at her next f/u appnt in our office.     3. Encounter for follow-up surveillance of breast cancer  Management of breast cancer survivors who have completed active treatment and have no evidence of disease involve cancer

## 2023-09-08 ENCOUNTER — OFFICE VISIT (OUTPATIENT)
Dept: ONCOLOGY | Age: 58
End: 2023-09-08
Payer: COMMERCIAL

## 2023-09-08 ENCOUNTER — HOSPITAL ENCOUNTER (OUTPATIENT)
Dept: INFUSION THERAPY | Age: 58
Discharge: HOME | End: 2023-09-08
Payer: COMMERCIAL

## 2023-09-08 VITALS
DIASTOLIC BLOOD PRESSURE: 56 MMHG | WEIGHT: 118.6 LBS | HEIGHT: 61 IN | HEART RATE: 73 BPM | TEMPERATURE: 97.8 F | RESPIRATION RATE: 20 BRPM | OXYGEN SATURATION: 100 % | SYSTOLIC BLOOD PRESSURE: 100 MMHG | BODY MASS INDEX: 22.39 KG/M2

## 2023-09-08 VITALS
HEART RATE: 73 BPM | DIASTOLIC BLOOD PRESSURE: 56 MMHG | SYSTOLIC BLOOD PRESSURE: 100 MMHG | BODY MASS INDEX: 22.39 KG/M2 | HEIGHT: 61 IN | RESPIRATION RATE: 20 BRPM | OXYGEN SATURATION: 100 % | TEMPERATURE: 97.8 F | WEIGHT: 118.6 LBS

## 2023-09-08 DIAGNOSIS — Z12.31 ENCOUNTER FOR SCREENING MAMMOGRAM FOR MALIGNANT NEOPLASM OF BREAST: ICD-10-CM

## 2023-09-08 DIAGNOSIS — D05.11 DUCTAL CARCINOMA IN SITU (DCIS) OF RIGHT BREAST: Primary | ICD-10-CM

## 2023-09-08 DIAGNOSIS — Z85.3 ENCOUNTER FOR FOLLOW-UP SURVEILLANCE OF BREAST CANCER: ICD-10-CM

## 2023-09-08 DIAGNOSIS — Z08 ENCOUNTER FOR FOLLOW-UP SURVEILLANCE OF BREAST CANCER: ICD-10-CM

## 2023-09-08 PROCEDURE — 99211 OFF/OP EST MAY X REQ PHY/QHP: CPT

## 2023-09-08 PROCEDURE — 99213 OFFICE O/P EST LOW 20 MIN: CPT | Performed by: NURSE PRACTITIONER

## 2023-11-15 NOTE — PROGRESS NOTES
and agreement    The patient will return to clinic PRN as clinically indicated. They have our clinic number to call with any questions or concerns if needed. Thank you for allowing us to be a part of their care. HPI:  Lian Pepper is a 55y.o. 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 Lian Pepper denied any breast symptoms and presented for screening mammography. She has very, extremely dense breast which lowers the sensitivity of her mammography. There was a possible cluster of calcifications in the upper outer breast posterior depth. On diagnostic imaging there is a 1 cm cluster of pleomorphic calcifications in the posterior depth. There is a second cluster just inferior and posterior to the first cluster. She underwent stereotactic core biopsy which revealed a high-grade ductal carcinoma in situ which was ER and AK negative. Patient had not had previous breast biopsies. Her sister was diagnosed with triple negative breast cancer at age 40. Her sister underwent genetic testing and no deleterious mutation was identified. She was treated at [facility]. Robbin Adhikari has no significant medical comorbidities. Menarche age 16. . She had her children at age 33 and [address]. She breast-fed both children. She took oral contraceptive medications for 15 years. Menopause age 47 no history of estrogen replacement therapy. Sister 40 history of breast cancer. Father history of prostate and lung cancer. Paternal grandmother history of uterine cancer paternal cousin history of ovarian cancer at a very young age. Right breast, lumpectomy:   Ductal carcinoma in situ, high nuclear grade, cribriform, solid, and   comedo types. Carcinoma in situ is >=6 mm in greatest dimension and present       within 7 of 14 blocks. Negative for invasive carcinoma.        DCIS is present at

## 2023-11-17 ENCOUNTER — HOSPITAL ENCOUNTER (OUTPATIENT)
Dept: RADIATION ONCOLOGY | Age: 58
Discharge: HOME | End: 2023-11-17
Attending: RADIOLOGY
Payer: COMMERCIAL

## 2023-11-17 VITALS
DIASTOLIC BLOOD PRESSURE: 61 MMHG | BODY MASS INDEX: 22.67 KG/M2 | WEIGHT: 120 LBS | RESPIRATION RATE: 16 BRPM | HEART RATE: 83 BPM | OXYGEN SATURATION: 100 % | TEMPERATURE: 97.9 F | SYSTOLIC BLOOD PRESSURE: 106 MMHG

## 2023-11-17 PROCEDURE — 99213 OFFICE O/P EST LOW 20 MIN: CPT

## 2023-11-17 PROCEDURE — 99212 OFFICE O/P EST SF 10 MIN: CPT

## 2023-11-17 ASSESSMENT — ENCOUNTER SYMPTOMS
BACK PAIN: 0
ABDOMINAL PAIN: 0
BLOOD IN STOOL: 0
COUGH: 0
SHORTNESS OF BREATH: 0
RECTAL PAIN: 0
NAUSEA: 0
DIARRHEA: 0
TROUBLE SWALLOWING: 0

## 2024-01-11 ENCOUNTER — OFFICE VISIT (OUTPATIENT)
Dept: SURGERY | Age: 59
End: 2024-01-11
Payer: COMMERCIAL

## 2024-01-11 ENCOUNTER — TELEPHONE (OUTPATIENT)
Dept: SURGERY | Age: 59
End: 2024-01-11

## 2024-01-11 VITALS
TEMPERATURE: 97.6 F | SYSTOLIC BLOOD PRESSURE: 122 MMHG | DIASTOLIC BLOOD PRESSURE: 64 MMHG | BODY MASS INDEX: 23.41 KG/M2 | HEIGHT: 61 IN | WEIGHT: 124 LBS | OXYGEN SATURATION: 98 % | RESPIRATION RATE: 16 BRPM | HEART RATE: 75 BPM

## 2024-01-11 DIAGNOSIS — R92.2 DENSE BREAST TISSUE: ICD-10-CM

## 2024-01-11 DIAGNOSIS — Z80.3 FAMILY HISTORY OF BREAST CANCER: ICD-10-CM

## 2024-01-11 DIAGNOSIS — C50.911 DUCTAL CARCINOMA OF RIGHT BREAST (HCC): Primary | ICD-10-CM

## 2024-01-11 PROCEDURE — 99213 OFFICE O/P EST LOW 20 MIN: CPT | Performed by: SURGERY

## 2024-01-11 ASSESSMENT — ENCOUNTER SYMPTOMS
SHORTNESS OF BREATH: 0
WHEEZING: 0
EYE REDNESS: 0
COUGH: 0
VOMITING: 0
BLOOD IN STOOL: 0
NAUSEA: 0
ABDOMINAL PAIN: 0
EYE ITCHING: 0
SORE THROAT: 0
COLOR CHANGE: 0
ABDOMINAL DISTENTION: 0
RHINORRHEA: 0

## 2024-01-11 NOTE — PROGRESS NOTES
is a second cluster just inferior and posterior to the first cluster.  She underwent stereotactic core biopsy which revealed a high-grade ductal carcinoma in situ which was ER and CO negative.  Patient had not had previous breast biopsies.  Her sister was diagnosed with triple negative breast cancer at age 44.  Her sister underwent genetic testing and no deleterious mutation was identified.  She was treated at [facility].  Tara opted for breast conservation therapy and underwent a right breast lumpectomy.  Pathology was high-grade DCIS, cribriform and solid and comedo types.  It was 6 mm in greatest dimension.  Margins were focally positive medially and she underwent reexcision of the medial margin which was negative for malignancy.  She saw Dr. Person of radiation oncology and underwent a course of external beam radiation to the right breast with 16 fractions given total of 4256 cGy.  She completed therapy 2021.    Interval history:    Tara denies any health issues.  Interval imaging since last visit MRI as well as mammography performed. Imaging reviewed.  No suspicious findings. Clinical breast examination today is benign.  She has good range of motion.  She denies any new health issues.  Sister diagnosed MM and treated last year.  Patient feels she is doing well.  She denies any breast symptoms.  She has no new health issues.  She appears very healthy reports she gets regular exercise.    Menarche age 14.  .  She had her children at age 28 and 31.  She breast-fed both children.  She took oral contraceptive medications for 15 years.  Menopause age 53 no history of estrogen replacement therapy.  Sister 44 history of breast cancer.  Father history of prostate and lung cancer.  Paternal grandmother history of uterine cancer paternal cousin history of ovarian cancer at a very young age.Older sister Multiple myeloma had BM txp .    Past Medical History  Past Medical History:   Diagnosis Date    Breast

## 2024-01-11 NOTE — TELEPHONE ENCOUNTER
Called Luis and spoke w/ Ludmila.  CPT code 78345 has been approved and valid 1/11/24 through 2/9/24.   Auth # [id number].

## 2024-02-02 ENCOUNTER — HOSPITAL ENCOUNTER (OUTPATIENT)
Dept: MRI IMAGING | Age: 59
Discharge: HOME | End: 2024-02-02
Attending: SURGERY
Payer: COMMERCIAL

## 2024-02-02 ENCOUNTER — HOSPITAL ENCOUNTER (OUTPATIENT)
Dept: WOMENS IMAGING | Age: 59
Discharge: HOME | End: 2024-02-02
Attending: RADIOLOGY

## 2024-02-02 DIAGNOSIS — R92.2 DENSE BREAST TISSUE: ICD-10-CM

## 2024-02-02 DIAGNOSIS — Z00.6 EXAM FOR CLINICAL RESEARCH: ICD-10-CM

## 2024-02-02 DIAGNOSIS — C50.911 DUCTAL CARCINOMA OF RIGHT BREAST (HCC): ICD-10-CM

## 2024-02-02 PROCEDURE — C8908 MRI W/O FOL W/CONT, BREAST,: HCPCS

## 2024-02-02 PROCEDURE — 6360000004 HC RX CONTRAST MEDICATION: Performed by: SURGERY

## 2024-02-02 PROCEDURE — A9579 GAD-BASE MR CONTRAST NOS,1ML: HCPCS | Performed by: SURGERY

## 2024-02-02 RX ADMIN — GADOTERIDOL 20 ML: 279.3 INJECTION, SOLUTION INTRAVENOUS at 08:13

## 2024-03-07 NOTE — PROGRESS NOTES
[facility] [address], [address]  [facility] [address]  Dept: [phone]  Dept Fax: [phone] Loc: [phone]      Visit Date:3/8/2024     Radha Lemon is a 58 y.o. female who presents today for:   Chief Complaint   Patient presents with    Follow-up     Ductal carcinoma in situ (DCIS) of right breast        HPI:   Radha Lemon is a 58 y.o. female  who follows in our office with history of breast cancer.  HPI per previous note in our office:  Radha denied any breast symptoms and presented for screening mammography.  She has very, extremely dense breast which lowers the sensitivity of her mammography.  There was a possible cluster of calcifications in the upper outer breast posterior depth.  On diagnostic imaging there is a 1 cm cluster of pleomorphic calcifications in the posterior depth.  There is a second cluster just inferior and posterior to the first cluster.  She underwent stereotactic core biopsy which revealed a high-grade ductal carcinoma in situ which was ER and MD negative.   Subsequently, she met with the surgeon Dr. Janelle De Leon.  After discussing her surgical treatment options she decided to proceed with right breast lumpectomy.  She had surgery on August 11, 2021.  Final pathology report showed  Right breast, lumpectomy:    Ductal carcinoma in situ, high nuclear grade, cribriform, solid, and   comedo types.       Carcinoma in situ is >=6 mm in greatest dimension and present       within 7 of 14 blocks.       Negative for invasive carcinoma.       DCIS is present at the medial margin (unifocal, less than 1 mm).       DCIS is 2 mm to the anterior margin.       Changes consistent with previous biopsy site.       Pseudoangiomatous stromal hyperplasia.       pTis     Estrogen Receptor (ER)     Negative   Progesterone Receptor (PgR)    Negative     Due to positive for medial margin she underwent reexcision on August 17, 2021.  Pathology report showed:  FINAL

## 2024-03-08 ENCOUNTER — OFFICE VISIT (OUTPATIENT)
Dept: ONCOLOGY | Age: 59
End: 2024-03-08

## 2024-03-08 ENCOUNTER — HOSPITAL ENCOUNTER (OUTPATIENT)
Dept: INFUSION THERAPY | Age: 59
Discharge: HOME | End: 2024-03-08
Payer: COMMERCIAL

## 2024-03-08 VITALS
TEMPERATURE: 97.9 F | OXYGEN SATURATION: 98 % | WEIGHT: 121 LBS | SYSTOLIC BLOOD PRESSURE: 112 MMHG | DIASTOLIC BLOOD PRESSURE: 70 MMHG | HEIGHT: 61 IN | BODY MASS INDEX: 22.84 KG/M2 | RESPIRATION RATE: 16 BRPM | HEART RATE: 58 BPM

## 2024-03-08 VITALS
RESPIRATION RATE: 16 BRPM | TEMPERATURE: 97.9 F | DIASTOLIC BLOOD PRESSURE: 70 MMHG | OXYGEN SATURATION: 98 % | SYSTOLIC BLOOD PRESSURE: 112 MMHG | HEART RATE: 58 BPM

## 2024-03-08 DIAGNOSIS — D05.11 DUCTAL CARCINOMA IN SITU (DCIS) OF RIGHT BREAST: ICD-10-CM

## 2024-03-08 DIAGNOSIS — Z85.3 ENCOUNTER FOR FOLLOW-UP SURVEILLANCE OF BREAST CANCER: ICD-10-CM

## 2024-03-08 DIAGNOSIS — Z08 ENCOUNTER FOR FOLLOW-UP SURVEILLANCE OF BREAST CANCER: ICD-10-CM

## 2024-03-08 DIAGNOSIS — Z12.31 ENCOUNTER FOR SCREENING MAMMOGRAM FOR MALIGNANT NEOPLASM OF BREAST: Primary | ICD-10-CM

## 2024-03-08 PROCEDURE — 99211 OFF/OP EST MAY X REQ PHY/QHP: CPT

## 2024-03-08 NOTE — PATIENT INSTRUCTIONS
Return to clinic in 6 mos to see Nafisa   Mammogram due after 7/2/2024-order placed.  Please schedule.

## 2024-07-18 ENCOUNTER — HOSPITAL ENCOUNTER (OUTPATIENT)
Dept: WOMENS IMAGING | Age: 59
Discharge: HOME | End: 2024-07-18
Payer: COMMERCIAL

## 2024-07-18 DIAGNOSIS — Z12.31 ENCOUNTER FOR SCREENING MAMMOGRAM FOR MALIGNANT NEOPLASM OF BREAST: ICD-10-CM

## 2024-07-18 PROCEDURE — 77063 BREAST TOMOSYNTHESIS BI: CPT

## 2024-08-28 ENCOUNTER — HOSPITAL ENCOUNTER (OUTPATIENT)
Dept: INFUSION THERAPY | Age: 59
Discharge: HOME | End: 2024-08-28
Payer: COMMERCIAL

## 2024-08-28 ENCOUNTER — OFFICE VISIT (OUTPATIENT)
Dept: ONCOLOGY | Age: 59
End: 2024-08-28
Payer: COMMERCIAL

## 2024-08-28 VITALS
DIASTOLIC BLOOD PRESSURE: 64 MMHG | BODY MASS INDEX: 22.09 KG/M2 | SYSTOLIC BLOOD PRESSURE: 99 MMHG | HEART RATE: 82 BPM | RESPIRATION RATE: 16 BRPM | WEIGHT: 117 LBS | HEIGHT: 61 IN | TEMPERATURE: 97.8 F | OXYGEN SATURATION: 98 %

## 2024-08-28 VITALS
RESPIRATION RATE: 16 BRPM | HEART RATE: 82 BPM | TEMPERATURE: 97.8 F | SYSTOLIC BLOOD PRESSURE: 99 MMHG | DIASTOLIC BLOOD PRESSURE: 64 MMHG

## 2024-08-28 DIAGNOSIS — D05.11 DUCTAL CARCINOMA IN SITU (DCIS) OF RIGHT BREAST: Primary | ICD-10-CM

## 2024-08-28 PROCEDURE — 99214 OFFICE O/P EST MOD 30 MIN: CPT | Performed by: INTERNAL MEDICINE

## 2024-08-28 PROCEDURE — 99211 OFF/OP EST MAY X REQ PHY/QHP: CPT

## 2024-08-28 NOTE — PROGRESS NOTES
[facility] [address], [address]  [facility] [address]  Dept: [phone]  Dept Fax: [phone] Loc: [phone]      Visit Date:8/28/2024     Radha Lemon is a 59 y.o. female who presents today for:   Chief Complaint   Patient presents with    Follow-up     Ductal carcinoma in situ (DCIS) of right breast        HPI:   Radha Lemon is a 59 y.o. female  who follows in our office with history of breast cancer.  HPI per previous note in our office:  Radha denied any breast symptoms and presented for screening mammography.  She has very, extremely dense breast which lowers the sensitivity of her mammography.  There was a possible cluster of calcifications in the upper outer breast posterior depth.  On diagnostic imaging there is a 1 cm cluster of pleomorphic calcifications in the posterior depth.  There is a second cluster just inferior and posterior to the first cluster.  She underwent stereotactic core biopsy which revealed a high-grade ductal carcinoma in situ which was ER and KS negative.   Subsequently, she met with the surgeon Dr. Janelle De Leon.  After discussing her surgical treatment options she decided to proceed with right breast lumpectomy.  She had surgery on August 11, 2021.  Final pathology report showed  Right breast, lumpectomy:    Ductal carcinoma in situ, high nuclear grade, cribriform, solid, and   comedo types.       Carcinoma in situ is >=6 mm in greatest dimension and present       within 7 of 14 blocks.       Negative for invasive carcinoma.       DCIS is present at the medial margin (unifocal, less than 1 mm).       DCIS is 2 mm to the anterior margin.       Changes consistent with previous biopsy site.       Pseudoangiomatous stromal hyperplasia.       pTis     Estrogen Receptor (ER)     Negative   Progesterone Receptor (PgR)    Negative     Due to positive for medial margin she underwent reexcision on August 17, 2021.  Pathology report showed:  FINAL  Relation Age of Onset    Heart Disease Mother         with stents    Prostate Cancer Father 60 - 69    Lung Cancer Father     Right Breast Cancer Sister 44    Left Breast Cancer Sister 44    Cancer Sister         multiple myeloma    No Known Problems Sister     No Known Problems Sister     No Known Problems Sister     Heart Disease Maternal Grandmother     Alzheimer's Disease Maternal Grandfather     Uterine Cancer Paternal Grandmother         >50    No Known Problems Paternal Grandfather     Cancer Paternal Cousin 18      Social History     Tobacco Use    Smoking status: Never    Smokeless tobacco: Never   Substance Use Topics    Alcohol use: Yes     Comment: occasionally      No current outpatient medications on file.     No current facility-administered medications for this visit.      No Known Allergies       Review of Systems:   Review of Systems   Pertinent review of systems noted in HPI, all other ROS negative.   Objective:   Physical Exam   BP 99/64 (Site: Left Upper Arm, Position: Sitting, Cuff Size: Medium Adult)   Pulse 82   Temp 97.8 °F (36.6 °C) (Oral)   Resp 16   Ht 1.549 m (5' 1\")   Wt 53.1 kg (117 lb)   SpO2 98%   BMI 22.11 kg/m²    General appearance: No apparent distress, calm and cooperative.  Breast exam: no palpable lumps or LN's bilaterally.  Respiratory:  Normal respiratory effort. Clear to auscultation all lung fields.  Cardiovascular: RRR, S1/S2  Abdomen: Soft, non-tender, non-distended with active BS  Musculoskeletal: No clubbing, cyanosis or edema bilaterally.  She is able to ambulate in office difficulty.  Skin: Skin color, texture, turgor normal.  No visible rashes or lesions.  Neurologic:  Neurovascularly intact without any focal sensory/motor deficits. Cranial nerves: II-XII intact, grossly non-focal.  Psychiatric: Alert and oriented x 3, thought content appropriate, normal insight      Imaging Studies and Labs:   CBC:   Lab Results   Component Value Date    WBC 5.1 07/22/2021

## 2024-08-28 NOTE — PATIENT INSTRUCTIONS
Orders Placed This Encounter   Procedures    MRI BREAST BILATERAL W WO CONTRAST     Reviewed the results of the MMG in 7/19/24: negative.  Return in about 5 months (around 2/6/2025).to review the results of the MRI breast

## 2025-02-05 ENCOUNTER — HOSPITAL ENCOUNTER (OUTPATIENT)
Dept: MRI IMAGING | Age: 60
Discharge: HOME | End: 2025-02-05
Attending: INTERNAL MEDICINE
Payer: COMMERCIAL

## 2025-02-05 DIAGNOSIS — D05.11 DUCTAL CARCINOMA IN SITU (DCIS) OF RIGHT BREAST: ICD-10-CM

## 2025-02-05 PROCEDURE — C8908 MRI W/O FOL W/CONT, BREAST,: HCPCS

## 2025-02-05 PROCEDURE — 6360000004 HC RX CONTRAST MEDICATION: Performed by: INTERNAL MEDICINE

## 2025-02-05 PROCEDURE — A9579 GAD-BASE MR CONTRAST NOS,1ML: HCPCS | Performed by: INTERNAL MEDICINE

## 2025-02-05 RX ADMIN — GADOTERIDOL 15 ML: 279.3 INJECTION, SOLUTION INTRAVENOUS at 09:50

## 2025-02-12 ENCOUNTER — OFFICE VISIT (OUTPATIENT)
Dept: ONCOLOGY | Age: 60
End: 2025-02-12
Payer: COMMERCIAL

## 2025-02-12 ENCOUNTER — HOSPITAL ENCOUNTER (OUTPATIENT)
Dept: INFUSION THERAPY | Age: 60
Discharge: HOME | End: 2025-02-12
Payer: COMMERCIAL

## 2025-02-12 VITALS
WEIGHT: 119 LBS | BODY MASS INDEX: 22.47 KG/M2 | RESPIRATION RATE: 16 BRPM | HEIGHT: 61 IN | SYSTOLIC BLOOD PRESSURE: 122 MMHG | HEART RATE: 80 BPM | OXYGEN SATURATION: 99 % | TEMPERATURE: 97.8 F | DIASTOLIC BLOOD PRESSURE: 60 MMHG

## 2025-02-12 VITALS
TEMPERATURE: 97.8 F | DIASTOLIC BLOOD PRESSURE: 60 MMHG | SYSTOLIC BLOOD PRESSURE: 122 MMHG | OXYGEN SATURATION: 99 % | RESPIRATION RATE: 16 BRPM | HEART RATE: 80 BPM

## 2025-02-12 DIAGNOSIS — Z12.31 ENCOUNTER FOR SCREENING MAMMOGRAM FOR MALIGNANT NEOPLASM OF BREAST: Primary | ICD-10-CM

## 2025-02-12 PROCEDURE — 99214 OFFICE O/P EST MOD 30 MIN: CPT | Performed by: INTERNAL MEDICINE

## 2025-02-12 PROCEDURE — 99211 OFF/OP EST MAY X REQ PHY/QHP: CPT

## 2025-02-12 NOTE — PATIENT INSTRUCTIONS
Orders Placed This Encounter   Procedures    [facility] DIGITAL SCREEN W OR WO CAD BILATERAL     Return in about 5 months (around 7/29/2025).

## 2025-02-12 NOTE — PROGRESS NOTES
[facility] [address], [address]  [facility] [address]  Dept: [phone]  Dept Fax: [phone] Loc: [phone]      Visit Date:2/12/2025     Radha Lemon is a 59 y.o. female who presents today for:   Chief Complaint   Patient presents with    Follow-up     Ductal carcinoma in situ (DCIS) of right breast        HPI:   Radha Lemon is a 59 y.o. female  who follows in our office with history of breast cancer.  HPI per previous note in our office:  Radha denied any breast symptoms and presented for screening mammography.  She has very, extremely dense breast which lowers the sensitivity of her mammography.  There was a possible cluster of calcifications in the upper outer breast posterior depth.  On diagnostic imaging there is a 1 cm cluster of pleomorphic calcifications in the posterior depth.  There is a second cluster just inferior and posterior to the first cluster.  She underwent stereotactic core biopsy which revealed a high-grade ductal carcinoma in situ which was ER and OK negative.   Subsequently, she met with the surgeon Dr. Janelle De Leon.  After discussing her surgical treatment options she decided to proceed with right breast lumpectomy.  She had surgery on August 11, 2021.  Final pathology report showed  Right breast, lumpectomy:    Ductal carcinoma in situ, high nuclear grade, cribriform, solid, and   comedo types.       Carcinoma in situ is >=6 mm in greatest dimension and present       within 7 of 14 blocks.       Negative for invasive carcinoma.       DCIS is present at the medial margin (unifocal, less than 1 mm).       DCIS is 2 mm to the anterior margin.       Changes consistent with previous biopsy site.       Pseudoangiomatous stromal hyperplasia.       pTis     Estrogen Receptor (ER)     Negative   Progesterone Receptor (PgR)    Negative     Due to positive for medial margin she underwent reexcision on August 17, 2021.  Pathology report showed:  FINAL

## 2025-04-09 ENCOUNTER — HOSPITAL ENCOUNTER (OUTPATIENT)
Age: 60
Discharge: HOME | End: 2025-04-09
Payer: COMMERCIAL

## 2025-04-09 LAB
ALBUMIN SERPL BCG-MCNC: 4.4 G/DL (ref 3.4–4.9)
ALP SERPL-CCNC: 59 U/L (ref 38–126)
ALT SERPL W/O P-5'-P-CCNC: 18 U/L (ref 10–35)
ANION GAP SERPL CALC-SCNC: 9 MEQ/L (ref 8–16)
AST SERPL-CCNC: 21 U/L (ref 10–35)
BASOPHILS ABSOLUTE: 0 THOU/MM3 (ref 0–0.1)
BASOPHILS NFR BLD AUTO: 0.8 %
BILIRUB SERPL-MCNC: 1 MG/DL (ref 0.3–1.2)
BUN SERPL-MCNC: 20 MG/DL (ref 8–23)
CALCIUM SERPL-MCNC: 9.5 MG/DL (ref 8.6–10)
CHLORIDE SERPL-SCNC: 106 MEQ/L (ref 98–111)
CHOLEST SERPL-MCNC: 180 MG/DL (ref 100–199)
CO2 SERPL-SCNC: 27 MEQ/L (ref 22–29)
CREAT SERPL-MCNC: 0.8 MG/DL (ref 0.5–0.9)
DEPRECATED RDW RBC AUTO: 45.8 FL (ref 35–45)
EOSINOPHIL NFR BLD AUTO: 5.9 %
EOSINOPHILS ABSOLUTE: 0.3 THOU/MM3 (ref 0–0.4)
ERYTHROCYTE [DISTWIDTH] IN BLOOD BY AUTOMATED COUNT: 12.8 % (ref 11.5–14.5)
GFR SERPL CREATININE-BSD FRML MDRD: 85 ML/MIN/1.73M2
GLUCOSE SERPL-MCNC: 98 MG/DL (ref 74–109)
HCT VFR BLD AUTO: 44.2 % (ref 37–47)
HDLC SERPL-MCNC: 107 MG/DL
HGB BLD-MCNC: 14.5 GM/DL (ref 12–16)
IMM GRANULOCYTES # BLD AUTO: 0.01 THOU/MM3 (ref 0–0.07)
IMM GRANULOCYTES NFR BLD AUTO: 0.2 %
LDLC SERPL CALC-MCNC: 65 MG/DL
LYMPHOCYTES ABSOLUTE: 1.8 THOU/MM3 (ref 1–4.8)
LYMPHOCYTES NFR BLD AUTO: 38.6 %
MCH RBC QN AUTO: 31.7 PG (ref 26–33)
MCHC RBC AUTO-ENTMCNC: 32.8 GM/DL (ref 32.2–35.5)
MCV RBC AUTO: 96.7 FL (ref 81–99)
MONOCYTES ABSOLUTE: 0.5 THOU/MM3 (ref 0.4–1.3)
MONOCYTES NFR BLD AUTO: 11.4 %
NEUTROPHILS ABSOLUTE: 2 THOU/MM3 (ref 1.8–7.7)
NEUTROPHILS NFR BLD AUTO: 43.1 %
NRBC BLD AUTO-RTO: 0 /100 WBC
PLATELET # BLD AUTO: 270 THOU/MM3 (ref 130–400)
PMV BLD AUTO: 9.2 FL (ref 9.4–12.4)
POTASSIUM SERPL-SCNC: 4.3 MEQ/L (ref 3.5–5.2)
PROT SERPL-MCNC: 7.1 G/DL (ref 6.4–8.3)
RBC # BLD AUTO: 4.57 MILL/MM3 (ref 4.2–5.4)
SODIUM SERPL-SCNC: 142 MEQ/L (ref 135–145)
TRIGL SERPL-MCNC: 41 MG/DL (ref 0–199)
TSH SERPL DL<=0.05 MIU/L-ACNC: 2.42 UIU/ML (ref 0.27–4.2)
WBC # BLD AUTO: 4.7 THOU/MM3 (ref 4.8–10.8)

## 2025-04-09 PROCEDURE — 80053 COMPREHEN METABOLIC PANEL: CPT

## 2025-04-09 PROCEDURE — 36415 COLL VENOUS BLD VENIPUNCTURE: CPT

## 2025-04-09 PROCEDURE — 85025 COMPLETE CBC W/AUTO DIFF WBC: CPT

## 2025-04-09 PROCEDURE — 84443 ASSAY THYROID STIM HORMONE: CPT

## 2025-04-09 PROCEDURE — 80061 LIPID PANEL: CPT

## 2025-04-30 ENCOUNTER — HOSPITAL ENCOUNTER (OUTPATIENT)
Dept: CT IMAGING | Age: 60
Discharge: HOME | End: 2025-04-30
Payer: COMMERCIAL

## 2025-04-30 DIAGNOSIS — R22.1 MASS IN NECK: ICD-10-CM

## 2025-04-30 PROCEDURE — 6360000004 HC RX CONTRAST MEDICATION: Performed by: NURSE PRACTITIONER

## 2025-04-30 PROCEDURE — 70491 CT SOFT TISSUE NECK W/DYE: CPT

## 2025-04-30 RX ORDER — IOPAMIDOL 755 MG/ML
100 INJECTION, SOLUTION INTRAVASCULAR
Status: COMPLETED | OUTPATIENT
Start: 2025-04-30 | End: 2025-04-30

## 2025-04-30 RX ADMIN — IOPAMIDOL 100 ML: 755 INJECTION, SOLUTION INTRAVENOUS at 09:07

## 2025-07-24 ENCOUNTER — HOSPITAL ENCOUNTER (OUTPATIENT)
Dept: WOMENS IMAGING | Age: 60
Discharge: HOME | End: 2025-07-24
Attending: INTERNAL MEDICINE
Payer: COMMERCIAL

## 2025-07-24 VITALS — WEIGHT: 119 LBS | HEIGHT: 61 IN | BODY MASS INDEX: 22.47 KG/M2

## 2025-07-24 DIAGNOSIS — Z12.31 ENCOUNTER FOR SCREENING MAMMOGRAM FOR MALIGNANT NEOPLASM OF BREAST: ICD-10-CM

## 2025-07-24 PROCEDURE — 77063 BREAST TOMOSYNTHESIS BI: CPT

## 2025-08-27 ENCOUNTER — OFFICE VISIT (OUTPATIENT)
Dept: ONCOLOGY | Age: 60
End: 2025-08-27
Payer: COMMERCIAL

## 2025-08-27 ENCOUNTER — HOSPITAL ENCOUNTER (OUTPATIENT)
Dept: INFUSION THERAPY | Age: 60
Discharge: HOME | End: 2025-08-27
Payer: COMMERCIAL

## 2025-08-27 VITALS
HEIGHT: 61 IN | DIASTOLIC BLOOD PRESSURE: 59 MMHG | HEART RATE: 70 BPM | RESPIRATION RATE: 16 BRPM | BODY MASS INDEX: 23.41 KG/M2 | OXYGEN SATURATION: 99 % | TEMPERATURE: 97.7 F | SYSTOLIC BLOOD PRESSURE: 106 MMHG | WEIGHT: 124 LBS

## 2025-08-27 VITALS
HEART RATE: 70 BPM | RESPIRATION RATE: 16 BRPM | DIASTOLIC BLOOD PRESSURE: 59 MMHG | TEMPERATURE: 97.7 F | OXYGEN SATURATION: 99 % | SYSTOLIC BLOOD PRESSURE: 106 MMHG

## 2025-08-27 DIAGNOSIS — Z08 ENCOUNTER FOR FOLLOW-UP SURVEILLANCE OF BREAST CANCER: ICD-10-CM

## 2025-08-27 DIAGNOSIS — D05.11 DUCTAL CARCINOMA IN SITU (DCIS) OF RIGHT BREAST: Primary | ICD-10-CM

## 2025-08-27 DIAGNOSIS — Z85.3 ENCOUNTER FOR FOLLOW-UP SURVEILLANCE OF BREAST CANCER: ICD-10-CM

## 2025-08-27 PROCEDURE — 99214 OFFICE O/P EST MOD 30 MIN: CPT | Performed by: INTERNAL MEDICINE

## 2025-08-27 PROCEDURE — 99211 OFF/OP EST MAY X REQ PHY/QHP: CPT

## 2025-08-27 RX ORDER — ESCITALOPRAM OXALATE 10 MG/1
10 TABLET ORAL DAILY
COMMUNITY
Start: 2025-08-06

## 2025-08-27 RX ORDER — SODIUM FLUORIDE 1.1 G/100G
CREAM ORAL
COMMUNITY
Start: 2025-05-21

## (undated) DEVICE — YANKAUER,BULB TIP,W/O VENT,RIGID,STERILE: Brand: MEDLINE

## (undated) DEVICE — SUTURE VCRL SZ 3-0 L27IN ABSRB UD L26MM SH 1/2 CIR J416H

## (undated) DEVICE — GLOVE SURG SZ 7 L12IN FNGR THK94MIL TRNSLUC YEL LTX HYDRGEL

## (undated) DEVICE — SUTURE VCRL SZ 2-0 L27IN ABSRB UD L26MM SH 1/2 CIR J417H

## (undated) DEVICE — SPECIMEN ORIENTATION CHARMS, SIX DISTINCTLY SHAPED STERILE 10MM CHARMS: Brand: MARGINMAP

## (undated) DEVICE — SYRINGE BULB 50/CS 48/PLT: Brand: MEDEGEN MEDICAL PRODUCTS, LLC

## (undated) DEVICE — SKIN AFFIX SURG ADHESIVE 72/CS 0.55ML: Brand: MEDLINE

## (undated) DEVICE — PACK PROCEDURE SURG PLAS SC MIN SRHP LF

## (undated) DEVICE — CHLORAPREP 26ML ORANGE

## (undated) DEVICE — TUBING, SUCTION, 1/4" X 12', STRAIGHT: Brand: MEDLINE

## (undated) DEVICE — SHEET, T, LAPAROTOMY, STERILE: Brand: MEDLINE

## (undated) DEVICE — SUTURE MCRYL SZ 4-0 L27IN ABSRB UD L19MM PS-2 1/2 CIR PRIM Y426H

## (undated) DEVICE — HYPODERMIC SAFETY NEEDLE: Brand: MAGELLAN

## (undated) DEVICE — BANDAGE ADH W1XL3IN NAT FAB WVN FLX DURABLE N ADH PD SEAL

## (undated) DEVICE — KENDALL SCD EXPRESS SLEEVES, KNEE LENGTH, MEDIUM: Brand: KENDALL SCD